# Patient Record
Sex: MALE | Race: WHITE | NOT HISPANIC OR LATINO | Employment: FULL TIME | RURAL
[De-identification: names, ages, dates, MRNs, and addresses within clinical notes are randomized per-mention and may not be internally consistent; named-entity substitution may affect disease eponyms.]

---

## 2022-04-16 ENCOUNTER — HOSPITAL ENCOUNTER (EMERGENCY)
Facility: HOSPITAL | Age: 52
Discharge: HOME | End: 2022-04-16
Payer: COMMERCIAL

## 2022-04-16 VITALS
BODY MASS INDEX: 30.52 KG/M2 | TEMPERATURE: 98 F | HEIGHT: 73 IN | RESPIRATION RATE: 18 BRPM | DIASTOLIC BLOOD PRESSURE: 83 MMHG | WEIGHT: 230.25 LBS | HEART RATE: 102 BPM | OXYGEN SATURATION: 96 % | SYSTOLIC BLOOD PRESSURE: 126 MMHG

## 2022-04-16 DIAGNOSIS — K76.0 HEPATIC STEATOSIS: ICD-10-CM

## 2022-04-16 DIAGNOSIS — K40.90 INGUINAL HERNIA WITHOUT OBSTRUCTION OR GANGRENE, RECURRENCE NOT SPECIFIED, UNSPECIFIED LATERALITY: ICD-10-CM

## 2022-04-16 DIAGNOSIS — K57.30 DIVERTICULOSIS OF COLON: Primary | ICD-10-CM

## 2022-04-16 DIAGNOSIS — R10.30 LOWER ABDOMINAL PAIN: ICD-10-CM

## 2022-04-16 LAB
BILIRUB UR QL STRIP: NEGATIVE
CLARITY UR: CLEAR
COLOR UR: YELLOW
GLUCOSE UR STRIP-MCNC: NEGATIVE MG/DL
KETONES UR STRIP-SCNC: NEGATIVE MG/DL
LEUKOCYTE ESTERASE UR QL STRIP: NEGATIVE
NITRITE UR QL STRIP: NEGATIVE
PH UR STRIP: 8.5 PH UNITS
PROT UR QL STRIP: NEGATIVE
RBC # UR STRIP: NEGATIVE /UL
SP GR UR STRIP: 1.02
UROBILINOGEN UR STRIP-ACNC: 0.2 MG/DL

## 2022-04-16 PROCEDURE — 99284 EMERGENCY DEPT VISIT MOD MDM: CPT | Mod: ,,, | Performed by: NURSE PRACTITIONER

## 2022-04-16 PROCEDURE — 99284 PR EMERGENCY DEPT VISIT,LEVEL IV: ICD-10-PCS | Mod: ,,, | Performed by: NURSE PRACTITIONER

## 2022-04-16 PROCEDURE — 81003 URINALYSIS AUTO W/O SCOPE: CPT | Performed by: NURSE PRACTITIONER

## 2022-04-16 PROCEDURE — 96374 THER/PROPH/DIAG INJ IV PUSH: CPT

## 2022-04-16 PROCEDURE — 96376 TX/PRO/DX INJ SAME DRUG ADON: CPT

## 2022-04-16 PROCEDURE — 63600175 PHARM REV CODE 636 W HCPCS: Performed by: NURSE PRACTITIONER

## 2022-04-16 PROCEDURE — 99284 EMERGENCY DEPT VISIT MOD MDM: CPT | Mod: 25

## 2022-04-16 PROCEDURE — 96375 TX/PRO/DX INJ NEW DRUG ADDON: CPT

## 2022-04-16 RX ORDER — HYDROCODONE BITARTRATE AND ACETAMINOPHEN 5; 325 MG/1; MG/1
1 TABLET ORAL EVERY 8 HOURS PRN
Qty: 10 TABLET | Refills: 0 | Status: SHIPPED | OUTPATIENT
Start: 2022-04-16

## 2022-04-16 RX ORDER — HYDROMORPHONE HYDROCHLORIDE 2 MG/ML
1 INJECTION, SOLUTION INTRAMUSCULAR; INTRAVENOUS; SUBCUTANEOUS
Status: COMPLETED | OUTPATIENT
Start: 2022-04-16 | End: 2022-04-16

## 2022-04-16 RX ORDER — MELOXICAM 15 MG/1
1 TABLET ORAL DAILY
COMMUNITY

## 2022-04-16 RX ORDER — METRONIDAZOLE 500 MG/1
500 TABLET ORAL EVERY 8 HOURS
Qty: 21 TABLET | Refills: 0 | Status: SHIPPED | OUTPATIENT
Start: 2022-04-16 | End: 2022-04-23

## 2022-04-16 RX ORDER — CIPROFLOXACIN 500 MG/1
500 TABLET ORAL 2 TIMES DAILY
Qty: 20 TABLET | Refills: 0 | Status: SHIPPED | OUTPATIENT
Start: 2022-04-16 | End: 2022-04-26

## 2022-04-16 RX ORDER — ONDANSETRON 2 MG/ML
4 INJECTION INTRAMUSCULAR; INTRAVENOUS
Status: COMPLETED | OUTPATIENT
Start: 2022-04-16 | End: 2022-04-16

## 2022-04-16 RX ORDER — HYDROMORPHONE HYDROCHLORIDE 2 MG/ML
2 INJECTION, SOLUTION INTRAMUSCULAR; INTRAVENOUS; SUBCUTANEOUS
Status: COMPLETED | OUTPATIENT
Start: 2022-04-16 | End: 2022-04-16

## 2022-04-16 RX ADMIN — HYDROMORPHONE HYDROCHLORIDE 1 MG: 2 INJECTION, SOLUTION INTRAMUSCULAR; INTRAVENOUS; SUBCUTANEOUS at 08:04

## 2022-04-16 RX ADMIN — HYDROMORPHONE HYDROCHLORIDE 2 MG: 2 INJECTION, SOLUTION INTRAMUSCULAR; INTRAVENOUS; SUBCUTANEOUS at 06:04

## 2022-04-16 RX ADMIN — ONDANSETRON 4 MG: 2 INJECTION INTRAMUSCULAR; INTRAVENOUS at 06:04

## 2022-04-16 NOTE — ED PROVIDER NOTES
Encounter Date: 4/16/2022       History     Chief Complaint   Patient presents with    Abdominal Pain    Groin Pain    Back Pain     ARRIVED AT ED C/O OF ABDOMINAL PAIN, BACK PAIN, GROIN PAIN X 1 DAY. HX OF KIDNEY CANCER, HAD 25 % OF RIGHT KIDNEY REMOVED IN 2019.     52 yo WM presents with 10/10 pain to lower abdomen. Constant since early AM, radiates into lower back and has a feeling of needing to have BM. No evidence of hernia on inspection. No history of kidney stones. Pain is located just above penis.         Review of patient's allergies indicates:  No Known Allergies  No past medical history on file.  No past surgical history on file.  No family history on file.     Review of Systems   Constitutional: Negative for chills and fever.   Respiratory: Negative for cough, shortness of breath and wheezing.    Cardiovascular: Negative for chest pain, palpitations and leg swelling.   Gastrointestinal: Positive for abdominal pain and nausea. Negative for constipation, diarrhea and vomiting.   Genitourinary: Negative for difficulty urinating, dysuria, genital sores, penile discharge, penile pain, penile swelling, scrotal swelling and testicular pain.   Psychiatric/Behavioral: The patient is nervous/anxious.    All other systems reviewed and are negative.      Physical Exam     Initial Vitals [04/16/22 1808]   BP Pulse Resp Temp SpO2   (!) 148/87 (!) 118 (!) 22 98.1 °F (36.7 °C) 99 %      MAP       --         Physical Exam    Nursing note and vitals reviewed.  Constitutional: He appears well-developed and well-nourished.   HENT:   Head: Normocephalic and atraumatic.   Nose: Nose normal.   Mouth/Throat: Oropharynx is clear and moist.   Eyes: Conjunctivae and EOM are normal. Pupils are equal, round, and reactive to light.   Neck: Neck supple.   Normal range of motion.  Cardiovascular: Regular rhythm. Tachycardia present.    Pulmonary/Chest: Breath sounds normal.   Abdominal: Abdomen is soft. There is abdominal  tenderness. There is guarding.   Genitourinary:    Penis normal.     Musculoskeletal:         General: Normal range of motion.      Cervical back: Normal range of motion and neck supple.     Neurological: He is alert and oriented to person, place, and time. He has normal strength. GCS score is 15. GCS eye subscore is 4. GCS verbal subscore is 5. GCS motor subscore is 6.   Psychiatric: His mood appears anxious.         Medical Screening Exam   See Full Note    ED Course   Procedures  Labs Reviewed   URINALYSIS, REFLEX TO URINE CULTURE - Abnormal; Notable for the following components:       Result Value    pH, UA 8.5 (*)     All other components within normal limits          Imaging Results          CT Abdomen Pelvis  Without Contrast (Final result)  Result time 04/16/22 18:50:19    Final result by Brigido Sung MD (04/16/22 18:50:19)                 Impression:      There is evidence of acute diverticulitis at the sigmoid colon level.  There is no abscess.  There is no evidence of pneumoperitoneum.    Cholelithiasis without obvious acute cholecystitis    Benign-appearing postsurgical changes of the right kidney    No hydronephrosis.  No radiopaque renal or ureteral stone      Electronically signed by: Brigido Sung  Date:    04/16/2022  Time:    18:50             Narrative:    EXAMINATION:  CT ABDOMEN AND PELVIS without contrast    CLINICAL HISTORY:  Lower abdominal pain.  Left lower quadrant pain.  History of renal cell carcinoma status post partial right nephrectomy    TECHNIQUE:  Axial CT images were obtained through the abdomen and pelvis without IV contrast oral contrast was not given.  Coronal and sagittal reconstructions submitted and interpreted.  Automated exposure control utilized.    COMPARISON:  No previous similar CT available    FINDINGS:  CT abdomen:    The partially visualized lung bases are clear.  There is no gross pleural or pericardial effusion.    There is no evidence of  pneumoperitoneum.    There is cholelithiasis.  There is no gallbladder wall thickening or abnormal pericholecystic edema.    There is moderate diffuse hepatic steatosis.  There is no focal hepatic mass.  There is no abnormal biliary dilatation.  Pancreas, spleen, bile ducts, adrenal glands, and left kidney appear normal.  There is a 36 mm fat density opacity projecting laterally from the mid right kidney which presumably represents an area of fat necrosis related to the previous partial nephrectomy.  There is no hydronephrosis on the right.    There is no abdominal aortic aneurysm.    There is diverticulosis of the colon.  There is strandy and hazy inflammatory change in the pericolonic fat at the sigmoid level compatible with acute diverticulitis.  There is no markie bowel obstruction.  Appendix is identified and appears normal.    CT pelvis:    Urinary bladder is mildly distended.  There is no pelvic soft tissue mass.  There is a fat containing left inguinal hernia.    No acute osseous findings.  There is mild lumbar spondylosis                                 Medications   HYDROmorphone (PF) injection 1 mg (has no administration in time range)   HYDROmorphone (PF) injection 2 mg (2 mg Intravenous Given 4/16/22 1828)   ondansetron injection 4 mg (4 mg Intravenous Given 4/16/22 1821)     Medical Decision Making:   Initial Assessment:   Suprapubic pain  ED Management:  - 52 yo WM with severe suprapubic abdominal pain. Beginning to radiate into lower back. Has feeling of needing to defecate.   - CT abdomen/pelvis ordered. Only able to do noncontrasted d/t problem with CT machine overheating  - Pain improved with medication  - CT with diverticulosis of sigmoid colon, inguinal hernia, cholelithiasis and hepatic steatosis  - Discussed CT findings with patient, offered telemed consult with [facility] and he states he will just come back if needed.   - Will treat with abx d/t level of pain, follow up with PCP. Short term of  Norco given after  checked.   - Return precautions discussed      Additional MDM:   Differential Diagnosis:   Symptom: Abdominal pain. <> The follow diagnoses were considered and will be evaluated: Cystitis, Epididymitis, Inguinal Hernia, Renal Stone and Testicular Torsion.                     Clinical Impression:   Final diagnoses:  [R10.30] Lower abdominal pain  [K57.30] Diverticulosis of colon (Primary)  [K76.0] Hepatic steatosis  [K40.90] Inguinal hernia without obstruction or gangrene, recurrence not specified, unspecified laterality          ED Disposition Condition    Discharge Stable        ED Prescriptions     Medication Sig Dispense Start Date End Date Auth. Provider    ciprofloxacin HCl (CIPRO) 500 MG tablet Take 1 tablet (500 mg total) by mouth 2 (two) times daily. for 10 days 20 tablet 4/16/2022 4/26/2022 CASEY Craig    metroNIDAZOLE (FLAGYL) 500 MG tablet Take 1 tablet (500 mg total) by mouth every 8 (eight) hours. for 7 days 21 tablet 4/16/2022 4/23/2022 CASEY Craig    HYDROcodone-acetaminophen (NORCO) 5-325 mg per tablet Take 1 tablet by mouth every 8 (eight) hours as needed for Pain. 10 tablet 4/16/2022  CASEY Craig        Follow-up Information    None                 CASEY Craig  04/16/22 2015

## 2022-04-17 NOTE — DISCHARGE INSTRUCTIONS
Follow up with your primary care provider as needed. Return to the ER for worsening symptoms. Thank you for choosing [facility] for your healthcare needs.

## 2022-04-17 NOTE — ED NOTES
Patient and wife given written and verbal instructions on s/s to observe for, both verbalized understanding.

## 2024-07-16 DIAGNOSIS — S46.011A STRAIN OF MUSCLE(S) AND TENDON(S) OF THE ROTATOR CUFF OF RIGHT SHOULDER, INITIAL ENCOUNTER: Primary | ICD-10-CM

## 2024-07-16 DIAGNOSIS — S46.111A STRAIN OF MUSCLE, FASCIA AND TENDON OF LONG HEAD OF BICEPS, RIGHT ARM, INITIAL ENCOUNTER: ICD-10-CM

## 2024-07-17 ENCOUNTER — CLINICAL SUPPORT (OUTPATIENT)
Dept: REHABILITATION | Facility: HOSPITAL | Age: 54
End: 2024-07-17
Payer: COMMERCIAL

## 2024-07-17 DIAGNOSIS — S46.011A STRAIN OF MUSCLE(S) AND TENDON(S) OF THE ROTATOR CUFF OF RIGHT SHOULDER, INITIAL ENCOUNTER: ICD-10-CM

## 2024-07-17 DIAGNOSIS — M25.511 ACUTE POSTOPERATIVE PAIN OF RIGHT SHOULDER: Primary | ICD-10-CM

## 2024-07-17 DIAGNOSIS — G89.18 ACUTE POSTOPERATIVE PAIN OF RIGHT SHOULDER: Primary | ICD-10-CM

## 2024-07-17 DIAGNOSIS — S46.111A STRAIN OF MUSCLE, FASCIA AND TENDON OF LONG HEAD OF BICEPS, RIGHT ARM, INITIAL ENCOUNTER: ICD-10-CM

## 2024-07-17 PROCEDURE — 97165 OT EVAL LOW COMPLEX 30 MIN: CPT

## 2024-07-17 PROCEDURE — 97530 THERAPEUTIC ACTIVITIES: CPT

## 2024-07-17 PROCEDURE — 97010 HOT OR COLD PACKS THERAPY: CPT

## 2024-07-17 PROCEDURE — 97110 THERAPEUTIC EXERCISES: CPT

## 2024-07-17 NOTE — PLAN OF CARE
Patient: Tyrel Marion [facility] #: [id number]  Date of evaluation: 2024     Referring Physician: Dinora Benson FN*  Diagnosis:   Encounter Diagnoses   Name Primary?    Strain of muscle(s) and tendon(s) of the rotator cuff of right shoulder, initial encounter     Strain of muscle, fascia and tendon of long head of biceps, right arm, initial encounter     Acute postoperative pain of right shoulder Yes     Referral Orders: Eval and Treat  Age: 53 y.o.  Sex: male          Hand dominance: Right  Physician Orders: Eval and Treat  Medical Diagnosis: biceps tenodesis and RC repair   Surgical Procedure and Date: RC repair and biceps tenodesis, 2024  Evaluation Date: 2024  Insurance Authorization Period Expiration: 2024 through 2024  Plan of Care Certification Period: 2024 through 2024  Date of Return to MD: 4 weeks from therapy eval  Visit # / Visits authorized:   FOTO: 1/ 3, INTAKE FOTO SCORE=44%, UPCOMING VISIT #5    Precautions:  none indicated    Time In: 0815  Time Out: 0910    Occupation: Maintenance at [employer]  Working presently: Yes  Last time worked: nights, presently  Workmen's Compensation: No    Date of onset: years of chronic shoulder pain  Involved area: right  Mechanism of Injury: normal wear and tear with heavy lifting and heavy work    No past medical history on file.  Precautions:   Current Outpatient Medications   Medication Sig    HYDROcodone-acetaminophen (NORCO) 5-325 mg per tablet Take 1 tablet by mouth every 8 (eight) hours as needed for Pain.    meloxicam (MOBIC) 15 MG tablet 1 tablet once daily at 6am.     No current facility-administered medications for this visit.     Review of patient's allergies indicates:  No Known Allergies  X-Rays/Tests: on file      Subjective:    Pain:  During no work: 1/10  While workin/10  Sleepin/10  Location of pain:    Tyrel's goals for therapy are: being able to sleep in the bed again without hurtin  "and going back to normal work       Objective:  Sensation Test: Patient denies any numbness/tingling    Observation/Inspection   Left Right   Anatomic Symmetry normal normal   Bony normal normal   Suparspinatus normal normal   Infraspinatus normal normal   Rhomboid normal normal     Observation/Inspection:  neither rounded shoulders nor forward head nor normal muscle tone for age      Range of Motion:   Right: limited as follows: (see measurements table below)  Left: WNL     (L) UE (R) UE     AROM PROM Norm   Shoulder Flexion   0-180   Shoulder Flexion wnl 95 180   Shoulder Abduction wnl 90 0-180   Shoulder Extension wnl 45 0-50   Shoulder Internal Rotation wnl 30 0-90   Shoulder External Rotation wnl 65 0-90   Horz Shoulder Adduction wnl 20 0-40     ROM Comments:   neither Soft end feel nor Pain at end range nor Pain at mid range     Painful Arc:   Patient demonstrates painful arc in shoulder flexion or abduction.  Through Flexion: Elevation 60 to 90 Degrees      Strength  Shoulder Flexion RUE: 2+/5   Shoulder Extension RUE: 3-/5   Shoulder Abduction RUE:  2+/5   Shoulder Adduction RUE:  2+/5   Internal Rotation RUE: 2+/5   External Rotation RUE: 2+/5   Horizontal Adduction RUE: 2+/5   Shoulder Flexion LUE: 5/5   Shoulder Extension LUE: 5/5   Shoulder Abduction LUE: 5/5   Shoulder Abbduction LUE: 5/5   Internal Rotation LUE:  5/5   External Rotation LUE:  5/5   Horizontal Adduction LUE:  5/5         Palpation: (for pain)     Positive: AC joint        Limitations of Functional Status:   Self Care: requires increase time to don clothes and reaching overhead  Work: has to have assist with any lifting  Leisure: "I haven't done anything since I had my surgery. I've been scared to."    Treatment included: OT evaluation, the following exercises (HEP) were instructed and Tyrel was able to demonstrate them prior to the end of the session. HEP are as follows:   Pendulum exercises  Reciprocal pulleys (pt has set of pulleys " the doctor office provided after surgery)        Assessment  This 53 y.o. male referred to Outpatient Occupational Therapy with diagnosis of   Encounter Diagnoses   Name Primary?    Strain of muscle(s) and tendon(s) of the rotator cuff of right shoulder, initial encounter     Strain of muscle, fascia and tendon of long head of biceps, right arm, initial encounter     Acute postoperative pain of right shoulder Yes    presents with limitations as described in problem list. Patient can benefit from Occupational Therapy services for PROM, AAROM, AROM, Theraputic exercises, joint mobs, home exercise program provied with written instructions, ice, strengthening, Theraband Ex, UBE, and pulley ex in order to maximize painfree functional use of  right UE. . The following goals were discussed with the patient and he is in agreement with them as to be addressed in the treatment plan.     Problem List:   Decreased function of Right UE, Decreased ROM, Increased pain, Decreased strength, Inability to perform work/tasks, Difficulty sleeping, Inability to perform leisure activiites, and Inability to perform self care tasks    Goals to be met in 4 weeks:  1) Pt will be independent and report performing HEP to maximize (R) shoulder functional capacity.  2) Pt will increase shoulder PROM in all measured planes of motion by 10 degrees to A with daily task.  3) Pt will report use of home modalities for pain management.  4) Pt will report one degree less of pain with nonuse and with use.  5) Pt will report interrupted sleep no more than twice a night.      CHARGES today:  Low complexity evaluation, 1 therex, 1 theract (pt HOME EXERCISE PROGRAM education and teaching)  Distributed handout for pulley exercises as well as pendulum exercises for pt to complete at home.    Discussed pt using biofreeze as well as ice application for home pain mgmt.  Pt relates good understanding.    Plan  Tyrel to be treated by Occupational Therapy 3 times  per week for 4 weeks to achieve the established goals.   Treatment to include AAROM Mobilization of GH joint, Ice, Strengthening Theraband Ex, UBE , and E- stim as well as any other treatments deemed necessary based on the patient's needs or progress.         I certify the need for these services furnished under this plan of treatment and while under my care    ____________________________________  Physician/Referring Practitioner    _______________  Date of Signature

## 2024-07-22 ENCOUNTER — CLINICAL SUPPORT (OUTPATIENT)
Dept: REHABILITATION | Facility: HOSPITAL | Age: 54
End: 2024-07-22
Payer: COMMERCIAL

## 2024-07-22 DIAGNOSIS — M25.511 ACUTE POSTOPERATIVE PAIN OF RIGHT SHOULDER: Primary | ICD-10-CM

## 2024-07-22 DIAGNOSIS — G89.18 ACUTE POSTOPERATIVE PAIN OF RIGHT SHOULDER: Primary | ICD-10-CM

## 2024-07-22 PROCEDURE — 97110 THERAPEUTIC EXERCISES: CPT

## 2024-07-22 PROCEDURE — 97010 HOT OR COLD PACKS THERAPY: CPT

## 2024-07-22 NOTE — PROGRESS NOTES
"[facility]  Occupational Therapy Treatment Note     Date: 7/22/2024  Name: Tyrel Marion [facility] Number: [id number]    Therapy Diagnosis: No diagnosis found.  Physician: Dinora Benson FN*    Referral Orders: Eval and Treat  Age: 53 y.o.  Sex: male          Hand dominance: Right  Physician Orders: Eval and Treat  Medical Diagnosis: biceps tenodesis and RC repair   Surgical Procedure and Date: RC repair and biceps tenodesis, 6/7/2024  Evaluation Date: 7/17/2024  Insurance Authorization Period Expiration: 7/17/2024 through 9/17/2024  Plan of Care Certification Period: 7/17/2024 through 9/17/2024  Date of Return to MD: 4 weeks from therapy eval  Visit # / Visits authorized: 2 / 12  FOTO: 1/ 3, INTAKE FOTO SCORE=44%, UPCOMING VISIT #5     Precautions:  none indicated     Time In: 0758  Time Out: 0853  TOTAL MINUTES: 55 minutes      Subjective     Patient reports: it's been aching this weekend.  I've been doing my exercises, the behind my back and the pulleys"  He was compliant with home exercise program given last session.   Response to previous treatment:progressing  Functional change: progressing    Pain: 3/10  Location: right shoulder      Objective     Objective Measures updated at progress report unless specified.    Treatment       Doni received the treatments listed below:      cold pack for 15 minutes to right shoulder.    therapeutic exercises to develop strength, endurance, ROM, and flexibility for 38 minutes including:  3 way pulleys 2 min each direction  UBE 5 min F/R motion low level resist  Shoulder Shrugs x 30 reps  Scap retracts x 30 reps  ER stretch x 10 x 10 s holds  IR stretch x 10 x 10 s holds  SUPINE ABC x 8 sets  Supine SA punches nonweighted  x 10 reps  GREEN putty squeeze, pull, twist x 5 min  ADD BEAR hug and BEAR hug rotation      Patient Education and Home Exercises     Education provided:   - new HOME EXERCISE PROGRAM exercises  - Progress and " prognosis towards goals     Written Home Exercises Provided: Patient instructed to cont prior HEP.  Exercises were reviewed and Doni was able to demonstrate them prior to the end of the session.  Doni demonstrated good  understanding of the home exercise program provided. See electronic medical record under Patient Instructions for exercises provided during therapy sessions.       Assessment     Pt progressing well, as expected.     Doni is progressing well towards his goals and there are no updates to goals at this time. Pt prognosis is Excellent.     Patient will continue to benefit from skilled outpatient occupational therapy to address the deficits listed in the problem list on initial evaluation provide patient/family education and to maximize patient's level of independence in the home and community environment.     Patient's spiritual, cultural and educational needs considered and patient agreeable to plan of care and goals.    Anticipated barriers to occupational therapy: none at this time    Goals:  Goals to be met in 4 weeks:  1) Pt will be independent and report performing HEP to maximize (R) shoulder functional capacity.  2) Pt will increase shoulder PROM in all measured planes of motion by 10 degrees to A with daily task.  3) Pt will report use of home modalities for pain management.  4) Pt will report one degree less of pain with nonuse and with use.  5) Pt will report interrupted sleep no more than twice a night.    Plan     Updates/Grading for next session: cont adding exercises per protocol for biceps tenodesis.    Jade Funez OT   7/22/2024

## 2024-07-24 ENCOUNTER — CLINICAL SUPPORT (OUTPATIENT)
Dept: REHABILITATION | Facility: HOSPITAL | Age: 54
End: 2024-07-24
Payer: COMMERCIAL

## 2024-07-24 DIAGNOSIS — G89.18 ACUTE POSTOPERATIVE PAIN OF RIGHT SHOULDER: Primary | ICD-10-CM

## 2024-07-24 DIAGNOSIS — M25.511 ACUTE POSTOPERATIVE PAIN OF RIGHT SHOULDER: Primary | ICD-10-CM

## 2024-07-24 PROCEDURE — 97110 THERAPEUTIC EXERCISES: CPT

## 2024-07-24 PROCEDURE — 97010 HOT OR COLD PACKS THERAPY: CPT

## 2024-07-24 NOTE — PROGRESS NOTES
[facility]  Occupational Therapy Treatment Note     Date: 7/24/2024  Name: Tyrel [facility] Number: [id number]    Therapy Diagnosis: No diagnosis found.  Physician: Dinora Benson FN*    Referral Orders: Eval and Treat  Age: 53 y.o.  Sex: male          Hand dominance: Right  Physician Orders: Eval and Treat  Medical Diagnosis: biceps tenodesis and RC repair   Surgical Procedure and Date: RC repair and biceps tenodesis, 6/7/2024  Evaluation Date: 7/17/2024  Insurance Authorization Period Expiration: 7/17/2024 through 9/17/2024  Plan of Care Certification Period: 7/17/2024 through 9/17/2024  Date of Return to MD: 4 weeks from therapy eval  Visit # / Visits authorized: 3 / 12  FOTO: 1/ 3, INTAKE FOTO SCORE=44%, UPCOMING VISIT #5     Precautions:  none indicated     Time In: 0800  Time Out: 0845  TOTAL MINUTES: 45 minutes      Subjective     Patient reports: it just steve hurts, been real achy  He was compliant with home exercise program given last session.   Response to previous treatment:progressing  Functional change: progressing    Pain: 2/10 during rest but during exercise pain number is 4/10  Location: right shoulder      Objective     Objective Measures updated at progress report unless specified.    Treatment       Doni received the treatments listed below:      therapeutic exercises to develop strength, endurance, ROM, and flexibility for 45 minutes including:  3 way pulleys 2 min each direction  UBE 5 min F/R motion low level resist  Shoulder Shrugs x 30 reps  Scap retracts x 30 reps  ER stretch x 10 x 10 s holds  IR stretch x 10 x 10 s holds  SUPINE ABC x  sets  Supine SA punches nonweighted  x 10 reps  GREEN putty squeeze, pull, twist x 10 min  BEAR hug and BEAR hug rotation x 5    Ice application x 15 minutes at end of session while completing green putty      Patient Education and Home Exercises     Education provided:   - new HOME EXERCISE PROGRAM  exercises  - Progress and prognosis towards goals     Written Home Exercises Provided: Patient instructed to cont prior HEP.  Exercises were reviewed and Doni was able to demonstrate them prior to the end of the session.  Doni demonstrated good  understanding of the home exercise program provided. See electronic medical record under Patient Instructions for exercises provided during therapy sessions.       Assessment     Pt progressing well, as expected.     Doni is progressing well towards his goals and there are no updates to goals at this time. Pt prognosis is Excellent.     Patient will continue to benefit from skilled outpatient occupational therapy to address the deficits listed in the problem list on initial evaluation provide patient/family education and to maximize patient's level of independence in the home and community environment.     Patient's spiritual, cultural and educational needs considered and patient agreeable to plan of care and goals.    Anticipated barriers to occupational therapy: none at this time    Goals:  Goals to be met in 4 weeks:  1) Pt will be independent and report performing HEP to maximize (R) shoulder functional capacity.  2) Pt will increase shoulder PROM in all measured planes of motion by 10 degrees to A with daily task.  3) Pt will report use of home modalities for pain management.  4) Pt will report one degree less of pain with nonuse and with use.  5) Pt will report interrupted sleep no more than twice a night.    Plan     Updates/Grading for next session: cont adding exercises per protocol for biceps tenodesis.    Jade Funez OT   7/24/2024

## 2024-07-26 ENCOUNTER — CLINICAL SUPPORT (OUTPATIENT)
Dept: REHABILITATION | Facility: HOSPITAL | Age: 54
End: 2024-07-26
Payer: COMMERCIAL

## 2024-07-26 DIAGNOSIS — M25.511 ACUTE POSTOPERATIVE PAIN OF RIGHT SHOULDER: Primary | ICD-10-CM

## 2024-07-26 DIAGNOSIS — G89.18 ACUTE POSTOPERATIVE PAIN OF RIGHT SHOULDER: Primary | ICD-10-CM

## 2024-07-26 PROCEDURE — 97010 HOT OR COLD PACKS THERAPY: CPT

## 2024-07-26 PROCEDURE — 97110 THERAPEUTIC EXERCISES: CPT

## 2024-07-26 NOTE — PROGRESS NOTES
[facility]  Occupational Therapy Treatment Note     Date: 7/26/2024  Name: Tyrel Marion [facility] Number: [id number]    Therapy Diagnosis:   Encounter Diagnosis   Name Primary?    Acute postoperative pain of right shoulder Yes     Physician: Dinora Benson FN*    Referral Orders: Eval and Treat  Age: 53 y.o.  Sex: male          Hand dominance: Right  Physician Orders: Eval and Treat  Medical Diagnosis: biceps tenodesis and RC repair   Surgical Procedure and Date: RC repair and biceps tenodesis, 6/7/2024  Evaluation Date: 7/17/2024  Insurance Authorization Period Expiration: 7/17/2024 through 9/17/2024  Plan of Care Certification Period: 7/17/2024 through 9/17/2024  Date of Return to MD: 4 weeks from therapy eval  Visit # / Visits authorized: 4/ 12  FOTO: 1/ 3, INTAKE FOTO SCORE=44%, UPCOMING VISIT #5     Precautions:  none indicated     Time In: 0805  Time Out: 0850  TOTAL MINUTES: 45 minutes      Subjective     Patient reports: it's a lot better this week, didn't ache near as bad  He was compliant with home exercise program given last session.   Response to previous treatment:progressing  Functional change: progressing    Pain: 2/10 during rest but during exercise pain number is 3/10  Location: right shoulder      Objective     Objective Measures updated at progress report unless specified.    Treatment       Doni received the treatments listed below:      therapeutic exercises to develop strength, endurance, ROM, and flexibility for 35 minutes including:  3 way pulleys 2 min each direction  UBE 5 min F/R motion low level resist  Shoulder Shrugs x 30 reps  Scap retracts x 30 reps  ER stretch x 10 x 10 s holds  IR stretch x 10 x 10 s holds  SUPINE ABC x  sets  Supine SA punches nonweighted  x 10 reps  BEAR hug and BEAR hug rotation x 5 NOT DONE TODAY  Scap 6 way 5 to 10 reps each direction, on mat prone                     Theract  ADD Wall slides next visit  VICENTE serrano  squeeze, pull, twist x 10 min (NOT done today)    Ice application x 10 minutes at end of session      Patient Education and Home Exercises     Education provided:   - new HOME EXERCISE PROGRAM exercises  - Progress and prognosis towards goals     Written Home Exercises Provided: Patient instructed to cont prior HEP.  Exercises were reviewed and Doni was able to demonstrate them prior to the end of the session.  Doni demonstrated good  understanding of the home exercise program provided. See electronic medical record under Patient Instructions for exercises provided during therapy sessions.       Assessment     Pt progressing well, as expected.     Doni is progressing well towards his goals and there are no updates to goals at this time. Pt prognosis is Excellent.     Patient will continue to benefit from skilled outpatient occupational therapy to address the deficits listed in the problem list on initial evaluation provide patient/family education and to maximize patient's level of independence in the home and community environment.     Patient's spiritual, cultural and educational needs considered and patient agreeable to plan of care and goals.    Anticipated barriers to occupational therapy: none at this time    Goals:  Goals to be met in 4 weeks:  1) Pt will be independent and report performing HEP to maximize (R) shoulder functional capacity. ONGOING/PROGRESSING  2) Pt will increase shoulder PROM in all measured planes of motion by 10 degrees to A with daily task. ONGOING/PROGRESSING  3) Pt will report use of home modalities for pain management. MET  4) Pt will report one degree less of pain with nonuse and with use. MET  5) Pt will report interrupted sleep no more than twice a night. ONGOING/PROGRESSING    Plan     Updates/Grading for next session: cont adding exercises per protocol for biceps tenodesis.    Jade Funez OT   7/26/2024

## 2024-07-29 ENCOUNTER — CLINICAL SUPPORT (OUTPATIENT)
Dept: REHABILITATION | Facility: HOSPITAL | Age: 54
End: 2024-07-29
Payer: COMMERCIAL

## 2024-07-29 DIAGNOSIS — M25.511 ACUTE POSTOPERATIVE PAIN OF RIGHT SHOULDER: Primary | ICD-10-CM

## 2024-07-29 DIAGNOSIS — G89.18 ACUTE POSTOPERATIVE PAIN OF RIGHT SHOULDER: Primary | ICD-10-CM

## 2024-07-29 PROCEDURE — 97530 THERAPEUTIC ACTIVITIES: CPT

## 2024-07-29 PROCEDURE — 97010 HOT OR COLD PACKS THERAPY: CPT

## 2024-07-29 PROCEDURE — 97110 THERAPEUTIC EXERCISES: CPT

## 2024-07-29 NOTE — PROGRESS NOTES
[facility]ERAP[facility]  Occupational Therapy Treatment Note     Date: 7/29/2024  Name: Tyrel Marion [facility] Number: [id number]    Therapy Diagnosis:   No diagnosis found.    Physician: Dinora Benson FN*    Referral Orders: Eval and Treat  Age: 53 y.o.  Sex: male          Hand dominance: Right  Physician Orders: Eval and Treat  Medical Diagnosis: biceps tenodesis and RC repair   Surgical Procedure and Date: RC repair and biceps tenodesis, 6/7/2024  Evaluation Date: 7/17/2024  Insurance Authorization Period Expiration: 7/17/2024 through 9/17/2024  Plan of Care Certification Period: 7/17/2024 through 9/17/2024  Date of Return to MD: 4 weeks from therapy eval  Visit # / Visits authorized: 5/ 12  FOTO: 1/ 3, INTAKE FOTO SCORE=44%, 51 VISIT #5     Precautions:  none indicated     Time In: 0800  Time Out: 0852  TOTAL MINUTES: 52 minutes      Subjective     Patient reports: it's feeling better  He was compliant with home exercise program given last session.   Response to previous treatment:progressing  Functional change: progressing    Pain: 2/10 during rest but during exercise pain number is 3/10  Location: right shoulder      Objective     Objective Measures updated at progress report unless specified.    Treatment       Doni received the treatments listed below:      therapeutic exercises to develop strength, endurance, ROM, and flexibility for 30 minutes including:  3 way pulleys 2 min each direction  UBE 5 min F/R motion low level resist  ER stretch x 10 x 10 s holds  IR stretch x 10 x 10 s holds  SUPINE ABC x  15 sets with 1# dumbbell  Supine SA punches nonweighted  x 10 reps with 1# dumbbell  BEAR hug and BEAR hug rotation x 5 NOT DONE TODAY  UBE x 5 min level 5 resist F/R   SCAP 3 way NEXT VISIT  RC 4 way NEXT VISIT    Theract x 12 min  Wall slides nonweighted 15 reps flexion only  BLUE putty squeeze, pull, twist x 10 min (NOT done today)    Ice application x 12 minutes at end of  session      Patient Education and Home Exercises     Education provided:   - new HOME EXERCISE PROGRAM exercises  - Progress and prognosis towards goals     Written Home Exercises Provided: Patient instructed to cont prior HEP.  Exercises were reviewed and Doni was able to demonstrate them prior to the end of the session.  Doni demonstrated good  understanding of the home exercise program provided. See electronic medical record under Patient Instructions for exercises provided during therapy sessions.       Assessment     Pt progressing well, as expected.     Doni is progressing well towards his goals and there are no updates to goals at this time. Pt prognosis is Excellent.     Patient will continue to benefit from skilled outpatient occupational therapy to address the deficits listed in the problem list on initial evaluation provide patient/family education and to maximize patient's level of independence in the home and community environment.     Patient's spiritual, cultural and educational needs considered and patient agreeable to plan of care and goals.    Anticipated barriers to occupational therapy: none at this time    Goals:  Goals to be met in 4 weeks:  1) Pt will be independent and report performing HEP to maximize (R) shoulder functional capacity. ONGOING/PROGRESSING  2) Pt will increase shoulder PROM in all measured planes of motion by 10 degrees to A with daily task. ONGOING/PROGRESSING  3) Pt will report use of home modalities for pain management. MET  4) Pt will report one degree less of pain with nonuse and with use. MET  5) Pt will report interrupted sleep no more than twice a night. ONGOING/PROGRESSING    Plan     Updates/Grading for next session: cont adding exercises per protocol for biceps tenodesis.    Jade Funez, OT   7/29/2024

## 2024-07-31 ENCOUNTER — CLINICAL SUPPORT (OUTPATIENT)
Dept: REHABILITATION | Facility: HOSPITAL | Age: 54
End: 2024-07-31
Payer: COMMERCIAL

## 2024-07-31 DIAGNOSIS — M25.511 ACUTE POSTOPERATIVE PAIN OF RIGHT SHOULDER: Primary | ICD-10-CM

## 2024-07-31 DIAGNOSIS — G89.18 ACUTE POSTOPERATIVE PAIN OF RIGHT SHOULDER: Primary | ICD-10-CM

## 2024-07-31 PROCEDURE — 97530 THERAPEUTIC ACTIVITIES: CPT

## 2024-07-31 PROCEDURE — 97110 THERAPEUTIC EXERCISES: CPT

## 2024-07-31 PROCEDURE — 97010 HOT OR COLD PACKS THERAPY: CPT

## 2024-07-31 NOTE — PROGRESS NOTES
"[facility]  Occupational Therapy Treatment Note     Date: 7/31/2024  Name: Tyrel Marion [facility]  Clinic Number: [id number]    Therapy Diagnosis:   Encounter Diagnosis   Name Primary?    Acute postoperative pain of right shoulder Yes       Physician: Dinora Benson FN*    Referral Orders: Eval and Treat  Age: 53 y.o.  Sex: male          Hand dominance: Right  Physician Orders: Eval and Treat  Medical Diagnosis: biceps tenodesis and RC repair   Surgical Procedure and Date: RC repair and biceps tenodesis, 6/7/2024  Evaluation Date: 7/17/2024  Insurance Authorization Period Expiration: 7/17/2024 through 9/17/2024  Plan of Care Certification Period: 7/17/2024 through 9/17/2024  Date of Return to MD: 4 weeks from therapy eval  Visit # / Visits authorized: 6/ 12  FOTO: 1/ 3, INTAKE FOTO SCORE=44%, 51 VISIT #5     Precautions:  none indicated     Time In: 0805  Time Out: 0855  TOTAL MINUTES: 50 minutes      Subjective     Patient reports:no new complaints; pt states, "my arm has felt better.  I can sleep in the bed now!"  He was compliant with home exercise program given last session.   Response to previous treatment:progressing  Functional change: progressing    Pain: 1/10   Location: right shoulder      Objective     Objective Measures updated at progress report unless specified.    Treatment       Doni received the treatments listed below:      therapeutic exercises to develop strength, endurance, ROM, and flexibility for 30 minutes including:  3 way pulleys 2 min each direction  UBE 5 min F/R motion level 6 resist  ER stretch x 10 x 10 s holds  IR stretch x 10 x 10 s holds  SUPINE ABC x  15 sets with 1# dumbbell  Supine SA punches nonweighted  x 10 reps with 1# dumbbell  BEAR hug and BEAR hug rotation x 5   UBE x 5 min level 5 resist F/R   SCAP 3 way RED 25x  RC 4 way NEXT VISIT    Theract x 10 min  Wall slides nonweighted 15 reps flexion only  BLUE putty squeeze, pull, twist x 10 min "     Ice application x 10 minutes at end of session      Patient Education and Home Exercises     Education provided:   - new HOME EXERCISE PROGRAM exercises  - Progress and prognosis towards goals     Written Home Exercises Provided: Patient instructed to cont prior HEP.  Exercises were reviewed and Doni was able to demonstrate them prior to the end of the session.  Doni demonstrated good  understanding of the home exercise program provided. See electronic medical record under Patient Instructions for exercises provided during therapy sessions.       Assessment     Pt progressing well, as expected.     Doni is progressing well towards his goals and there are no updates to goals at this time. Pt prognosis is Excellent.     Patient will continue to benefit from skilled outpatient occupational therapy to address the deficits listed in the problem list on initial evaluation provide patient/family education and to maximize patient's level of independence in the home and community environment.     Patient's spiritual, cultural and educational needs considered and patient agreeable to plan of care and goals.    Anticipated barriers to occupational therapy: none at this time    Goals:  Goals to be met in 4 weeks:  1) Pt will be independent and report performing HEP to maximize (R) shoulder functional capacity. ONGOING/PROGRESSING  2) Pt will increase shoulder PROM in all measured planes of motion by 10 degrees to A with daily task. ONGOING/PROGRESSING  3) Pt will report use of home modalities for pain management. MET  4) Pt will report one degree less of pain with nonuse and with use. MET  5) Pt will report interrupted sleep no more than twice a night. ONGOING/PROGRESSING    Plan     Updates/Grading for next session: cont adding exercises per protocol for biceps tenodesis.    Jade Funez OT   7/31/2024

## 2024-08-02 ENCOUNTER — CLINICAL SUPPORT (OUTPATIENT)
Dept: REHABILITATION | Facility: HOSPITAL | Age: 54
End: 2024-08-02
Payer: COMMERCIAL

## 2024-08-02 DIAGNOSIS — G89.18 ACUTE POSTOPERATIVE PAIN OF RIGHT SHOULDER: Primary | ICD-10-CM

## 2024-08-02 DIAGNOSIS — M25.511 ACUTE POSTOPERATIVE PAIN OF RIGHT SHOULDER: Primary | ICD-10-CM

## 2024-08-02 PROCEDURE — 97010 HOT OR COLD PACKS THERAPY: CPT

## 2024-08-02 PROCEDURE — 97110 THERAPEUTIC EXERCISES: CPT

## 2024-08-02 PROCEDURE — 97530 THERAPEUTIC ACTIVITIES: CPT

## 2024-08-02 NOTE — PROGRESS NOTES
[facility]  Occupational Therapy Treatment Note     Date: 8/2/2024  Name: Tyrel Marion [facility] Number: [id number]    Therapy Diagnosis:   Encounter Diagnosis   Name Primary?    Acute postoperative pain of right shoulder Yes       Physician: Dinora Benson FN*    Referral Orders: Eval and Treat  Age: 53 y.o.  Sex: male          Hand dominance: Right  Physician Orders: Eval and Treat  Medical Diagnosis: biceps tenodesis and RC repair   Surgical Procedure and Date: RC repair and biceps tenodesis, 6/7/2024  Evaluation Date: 7/17/2024  Insurance Authorization Period Expiration: 7/17/2024 through 9/17/2024  Plan of Care Certification Period: 7/17/2024 through 9/17/2024  Date of Return to MD: 4 weeks from therapy eval  Visit # / Visits authorized: 7/ 12  FOTO: 1/ 3, INTAKE FOTO SCORE=44%, 51% VISIT #5     Precautions:  none indicated     Time In: 0800  Time Out: 0900  TOTAL MINUTES: 60 minutes      Subjective     Patient reports:I believe this arm is doing a lot better!  He was compliant with home exercise program given last session.   Response to previous treatment:progressing  Functional change: progressing    Pain: 1/10   Location: right shoulder      Objective     Objective Measures updated at progress report unless specified.    Treatment       Doni received the treatments listed below:      therapeutic exercises to develop strength, endurance, ROM, and flexibility for 45 minutes including:  3 way pulleys 2 min each direction  UBE 5 min F/R motion level 6 resist  ER stretch x 10 x 10 s holds  IR stretch x 10 x 10 s holds  SUPINE ABC x  15 sets with 2# dumbbell  Supine SA punches nonweighted  x 10 reps with 2# dumbbell  BEAR hug and BEAR hug rotation x 5   UBE x 5 min level 7 resist F/R   SCAP 3 way BLUE 25x  RC 4 way 10 reps each RED    Theract x 8 min  Wall slides 1# weight 15 reps circular right and left, 20x flexion, 3 way  BLUE putty squeeze, pull, twist x 10 min     Ice  application x 15 minutes at end of session with blue putty activity completed during ice application      Patient Education and Home Exercises     Education provided:   - new HOME EXERCISE PROGRAM exercises  - Progress and prognosis towards goals     Written Home Exercises Provided: Patient instructed to cont prior HEP.  Exercises were reviewed and Doni was able to demonstrate them prior to the end of the session.  Doni demonstrated good  understanding of the home exercise program provided. See electronic medical record under Patient Instructions for exercises provided during therapy sessions.       Assessment     Pt progressing well, as expected.     Doni is progressing well towards his goals and there are no updates to goals at this time. Pt prognosis is Excellent.     Patient will continue to benefit from skilled outpatient occupational therapy to address the deficits listed in the problem list on initial evaluation provide patient/family education and to maximize patient's level of independence in the home and community environment.     Patient's spiritual, cultural and educational needs considered and patient agreeable to plan of care and goals.    Anticipated barriers to occupational therapy: none at this time    Goals:  Goals to be met in 4 weeks:  1) Pt will be independent and report performing HEP to maximize (R) shoulder functional capacity. ONGOING/PROGRESSING  2) Pt will increase shoulder PROM in all measured planes of motion by 10 degrees to A with daily task. ONGOING/PROGRESSING  3) Pt will report use of home modalities for pain management. MET  4) Pt will report one degree less of pain with nonuse and with use. MET  5) Pt will report interrupted sleep no more than twice a night. ONGOING/PROGRESSING    Plan     Updates/Grading for next session: cont adding exercises per protocol for biceps tenodesis.    Jade Funez OT   8/2/2024

## 2024-08-05 ENCOUNTER — CLINICAL SUPPORT (OUTPATIENT)
Dept: REHABILITATION | Facility: HOSPITAL | Age: 54
End: 2024-08-05
Payer: COMMERCIAL

## 2024-08-05 DIAGNOSIS — M25.511 ACUTE POSTOPERATIVE PAIN OF RIGHT SHOULDER: Primary | ICD-10-CM

## 2024-08-05 DIAGNOSIS — G89.18 ACUTE POSTOPERATIVE PAIN OF RIGHT SHOULDER: Primary | ICD-10-CM

## 2024-08-05 PROCEDURE — 97014 ELECTRIC STIMULATION THERAPY: CPT

## 2024-08-05 PROCEDURE — 97010 HOT OR COLD PACKS THERAPY: CPT

## 2024-08-05 PROCEDURE — 97110 THERAPEUTIC EXERCISES: CPT

## 2024-08-05 PROCEDURE — 97140 MANUAL THERAPY 1/> REGIONS: CPT

## 2024-08-13 ENCOUNTER — DOCUMENTATION ONLY (OUTPATIENT)
Dept: REHABILITATION | Facility: HOSPITAL | Age: 54
End: 2024-08-13
Payer: COMMERCIAL

## 2024-08-13 NOTE — PROGRESS NOTES
"  Patient has not returned to therapy since 8/5/2024 and has not scheduled to return as of yet.  It is unclear if he is waiting to return to his physician for his followup visit prior to returning to therapy.  Have discussed this with our Patient Access Rep, and she is following up on this with the patient.  He reported on last visit "I don't know what's wrong. I didn't do anything to it. Unless I slept wrong, cause after I got up from sleeping yesterday, it felt stiff and then it just started swelling."  Will wait on d/c until hearing from patient for further clarification.    Jade Funez, OTR/RONNIE   8/13/2024  "

## 2024-09-03 ENCOUNTER — CLINICAL SUPPORT (OUTPATIENT)
Dept: REHABILITATION | Facility: HOSPITAL | Age: 54
End: 2024-09-03
Payer: COMMERCIAL

## 2024-09-03 DIAGNOSIS — G89.18 ACUTE POSTOPERATIVE PAIN OF RIGHT SHOULDER: Primary | ICD-10-CM

## 2024-09-03 DIAGNOSIS — M25.511 ACUTE POSTOPERATIVE PAIN OF RIGHT SHOULDER: Primary | ICD-10-CM

## 2024-09-03 PROCEDURE — 97035 APP MDLTY 1+ULTRASOUND EA 15: CPT

## 2024-09-03 PROCEDURE — 97110 THERAPEUTIC EXERCISES: CPT

## 2024-09-03 NOTE — PLAN OF CARE
Reasons for Recertification of Therapy: patient has missed 4 weeks of tx due to setback in his care; see note today for details.  Per pt reports, surgeon states that pt new imaging is negative for new problems in the right shoulder area, so POC will not change other than to add ultrasound modality to address inflammation and edema pocketing in posterior/mid shoulder distal to right shoulder joint.    Plan     Updated Certification Period: 9/3/2024 to 11/3/2024  Recommended Treatment Plan: 2 times per week for 6 weeks: Moist Heat/ Ice, Neuromuscular Re-ed, Patient Education, Therapeutic Activities, Therapeutic Exercise, and Ultrasound  Other Recommendations: modalities as needed to address pain and inflammation    Jade Funez OT  9/3/2024      I CERTIFY THE NEED FOR THESE SERVICES FURNISHED UNDER THIS PLAN OF TREATMENT AND WHILE UNDER MY CARE.    Physician's comments:      Physician's Signature: ___________________________________________________

## 2024-09-03 NOTE — PROGRESS NOTES
[facility]  Occupational Therapy Treatment Note     Date: 9/3/2024  Name: Tyrel Marion [facility]  Clinic Number: [id number]    Therapy Diagnosis:   Encounter Diagnosis   Name Primary?    Acute postoperative pain of right shoulder Yes       Physician: Dinora Benson FN*    Referral Orders: Eval and Treat  Age: 53 y.o.  Sex: male          Hand dominance: Right  Physician Orders: Eval and Treat  Medical Diagnosis: biceps tenodesis and RC repair   Surgical Procedure and Date: RC repair and biceps tenodesis, 6/7/2024  Evaluation Date: 7/17/2024  Insurance Authorization Period Expiration: 7/17/2024 through 9/17/2024  Plan of Care Certification Period: 7/17/2024 through 9/17/2024  Date of Return to MD: 4 weeks from therapy eval  Visit # / Visits authorized: 9/ 12  FOTO: 1/ 3, INTAKE FOTO SCORE=44%, 51% VISIT #5     Precautions:  none indicated     Time In: 0915  Time Out: 1000  TOTAL MINUTES: 45 minutes      Subjective     Patient reports The doctor said I didn't have anything wrong with my shoulder, but he thinks my pain is coming from my neck.  He was compliant with home exercise program given last session.   Response to previous treatment:progressing  Functional change: progressing        Pain: 7/10   Location: right shoulder      Objective     Objective Measures updated at progress report unless specified.    Treatment       Doni received the treatments listed below:        THEREX modified today due to acute pain and for approx 20 minutes:  UBE x 5 min in no resist form  IR stretch x 10  ER stretch in corner x 10  3 way pulleys 2 min each direction  SCAP 3 way RED 25x  RC 4 way 25 reps each MANUEL Sarah received ultrasound to manage pain and inflammation at 10 % duty cycle applied to the right shoulder below shoulder joint and in mid to posterior proximity at an intensity of  1.1mHz at 5 W/cm2  for a duration of 5 minutes to target built up edema visible in this area. Patient  "tolerated treatment well without adverse effects. Therapist was in attendance throughout intervention.    A Portion of pt tx session was spent in concurrent treatment with another patient (Medicare A/B), so charges adjusted as needed for this concurrent tx.      Patient Education and Home Exercises     Education provided:   - new HOME EXERCISE PROGRAM exercises  - Progress and prognosis towards goals     Written Home Exercises Provided: Patient instructed to cont prior HEP.  Exercises were reviewed and Doni was able to demonstrate them prior to the end of the session.  Doni demonstrated good  understanding of the home exercise program provided. See electronic medical record under Patient Instructions for exercises provided during therapy sessions.       Assessment     Pt has had set back in pain and mobility due to what he thinks is "sleeping wrong."  But as he went back to see his ortho physician, the surgeon told him it is felt the pain and problem are coming from patient neck area, not his shoulder.  Pt reports that his imaging was negative for new problems in his shoulder.  Pt also could not state if physician wanted him to address the cervical neck problem that was found per pt report.    Doni is progressing well towards his goals and there are no updates to goals at this time. Pt prognosis is Excellent overall.    Patient will continue to benefit from skilled outpatient occupational therapy to address the deficits listed in the problem list on initial evaluation provide patient/family education and to maximize patient's level of independence in the home and community environment.     Patient's spiritual, cultural and educational needs considered and patient agreeable to plan of care and goals.    Anticipated barriers to occupational therapy: none at this time    Goals:  Goals to be met in 4 weeks:  1) Pt will be independent and report performing HEP to maximize (R) shoulder functional capacity. " ONGOING/PROGRESSING  2) Pt will increase shoulder PROM in all measured planes of motion by 10 degrees to A with daily task. ONGOING/PROGRESSING  3) Pt will report use of home modalities for pain management. MET  4) Pt will report one degree less of pain with nonuse and with use. MET  5) Pt will report interrupted sleep no more than twice a night. ONGOING/PROGRESSING    Plan     Updates/Grading for next session: adding Ultrasound to reduce edema in right shoulder.  Cont with POC as pt can tolerate.  Pt has new script that we will add to his EMR; we will update the visit numbers authorized by physician as needed.    Jade Funez OT   9/3/2024

## 2024-09-05 ENCOUNTER — CLINICAL SUPPORT (OUTPATIENT)
Dept: REHABILITATION | Facility: HOSPITAL | Age: 54
End: 2024-09-05
Payer: COMMERCIAL

## 2024-09-05 DIAGNOSIS — M25.511 ACUTE POSTOPERATIVE PAIN OF RIGHT SHOULDER: Primary | ICD-10-CM

## 2024-09-05 DIAGNOSIS — G89.18 ACUTE POSTOPERATIVE PAIN OF RIGHT SHOULDER: Primary | ICD-10-CM

## 2024-09-05 PROCEDURE — 97110 THERAPEUTIC EXERCISES: CPT

## 2024-09-05 PROCEDURE — 97035 APP MDLTY 1+ULTRASOUND EA 15: CPT

## 2024-09-05 PROCEDURE — 97530 THERAPEUTIC ACTIVITIES: CPT

## 2024-09-05 NOTE — PROGRESS NOTES
[facility]  Occupational Therapy Treatment Note     Date: 9/5/2024  Name: Tyrel Marion [facility]  Clinic Number: [id number]    Therapy Diagnosis:   Encounter Diagnosis   Name Primary?    Acute postoperative pain of right shoulder Yes       Physician: Dinora Benson FN*    Referral Orders: Eval and Treat  Age: 53 y.o.  Sex: male          Hand dominance: Right  Physician Orders: Eval and Treat  Medical Diagnosis: biceps tenodesis and RC repair   Surgical Procedure and Date: RC repair and biceps tenodesis, 6/7/2024  Evaluation Date: 7/17/2024  Insurance Authorization Period Expiration: 7/17/2024 through 9/17/2024  Plan of Care Certification Period: 7/17/2024 through 9/17/2024  Date of Return to MD: 4 weeks from therapy eval  Visit # / Visits authorized: 10/ 12  FOTO: 1/ 3, INTAKE FOTO SCORE=44%, 51% VISIT #5, 57% VISIT 9, UPCOMING LAST VISIT     Precautions:  none indicated     Time In: 0850  Time Out: 0941  TOTAL MINUTES: 51 minutes      Subjective     Patient reports I see now that I am using my neck and upper shoulder muscles to move my arm, and that's what's causing my pain.  He was compliant with home exercise program given last session.   Response to previous treatment:progressing  Functional change: progressing        Pain: 5/10   Location: right shoulder      Objective     Objective Measures updated at progress report unless specified.    Treatment       Doni received the treatments listed below:        THEREX modified today due to acute pain and for approx 22 minutes:  UBE x 5 min in no resist form to avoid straining trapezius  IR stretch x 10  ER stretch in corner x 10  3 way pulleys 2 min each direction  SCAP 3 way RED 25x  RC 4 way 25 reps each RED    THERACT x 15 min:  Wall slides 1# wrist weight, 15 reps each direction    Pt needed to be educated on the following:  To avoid trapezius strain during shoulder therapy, you can try these tips:      Avoid overexertion:  Gradually increase the intensity of your exercise routine to prevent sudden muscle strain.      Stretch: Stretching can help prevent or relieve pain and keep your trapezius muscles from becoming too tight. When stretching, move gently and avoid bouncing or jerky movements. Hold each stretch for about 15-30 seconds.      Warm up: Before exercising, take time to warm up and stretch so you're less likely to injure your muscles.      Avoid repetitive motions: Repetitive motions can cause repetitive strain injury, which is damage to a muscle or tendon.      Maintain a healthy weight: Carrying extra weight can increase your risk of muscle strain.      Be aware of your posture: Hunching over can put increased stress on your trapezius muscles.      Chin retraction: To help with tech neck, try pulling your chin back while looking straight ahead. You should feel a double chin form under your jaw.      Other techniques that may help with trapezius pain include: Biofeedback training, Manual pressure release massage, Ischemic compression massage, and Cupping.      Tyrel received ultrasound to manage pain and inflammation at 10% duty cycle applied to the right shoulder below shoulder joint and in mid to posterior proximity of shouder joint as well as mid trapezius and upper trapezius at an intensity of  1.1mHz at 5 W/cm2  for a duration of 8 minutes to target built up edema visible in this area. Patient tolerated treatment well without adverse effects. Therapist was in attendance throughout intervention.        Patient Education and Home Exercises     Education provided:   - new HOME EXERCISE PROGRAM exercises  - Educated pt to use his TENS at home with moist heat for 10 min then ice pack for 10 min on mid trap area  - Progress and prognosis towards goals     Written Home Exercises Provided: Patient instructed to cont prior HEP.  Exercises were reviewed and Doni was able to demonstrate them prior to the end of the session.   Doni demonstrated good  understanding of the home exercise program provided. See electronic medical record under Patient Instructions for exercises provided during therapy sessions.       Assessment     Pt is understanding where his pain is coming from and how substitutionary movement can cause this problem. OT is providing biofeedback training, cueing for stretching and posture while completing exercise, to provide pt with feedback to catch his improper movement and make changes during his exercises to avoid straining his trapezius and cervical muscles.  He relates excellent understanding at this point.    Doni is progressing well towards his goals and there are no updates to goals at this time. Pt prognosis is Excellent overall.    Patient will continue to benefit from skilled outpatient occupational therapy to address the deficits listed in the problem list on initial evaluation provide patient/family education and to maximize patient's level of independence in the home and community environment.     Patient's spiritual, cultural and educational needs considered and patient agreeable to plan of care and goals.    Anticipated barriers to occupational therapy: none at this time    Goals:  Goals to be met in 4 weeks:  1) Pt will be independent and report performing HEP to maximize (R) shoulder functional capacity. ONGOING/PROGRESSING  2) Pt will increase shoulder PROM in all measured planes of motion by 10 degrees to A with daily task. ONGOING/PROGRESSING  3) Pt will report use of home modalities for pain management. MET  4) Pt will report one degree less of pain with nonuse and with use. MET  5) Pt will report interrupted sleep no more than twice a night. ONGOING/PROGRESSING    Plan     Updates/Grading for next session: adding Ultrasound to reduce edema in right shoulder.  Cont with POC as pt can tolerate.  Pt has new script that we will add to his EMR; we will update the visit numbers authorized by physician  as needed.    Jade Funez, OT   9/5/2024

## 2024-09-06 DIAGNOSIS — M54.2 CERVICALGIA: Primary | ICD-10-CM

## 2024-09-09 ENCOUNTER — CLINICAL SUPPORT (OUTPATIENT)
Dept: REHABILITATION | Facility: HOSPITAL | Age: 54
End: 2024-09-09
Payer: COMMERCIAL

## 2024-09-09 DIAGNOSIS — M54.2 CERVICALGIA: ICD-10-CM

## 2024-09-09 PROCEDURE — 97163 PT EVAL HIGH COMPLEX 45 MIN: CPT

## 2024-09-09 PROCEDURE — 97140 MANUAL THERAPY 1/> REGIONS: CPT

## 2024-09-09 PROCEDURE — 97110 THERAPEUTIC EXERCISES: CPT

## 2024-09-09 NOTE — PROGRESS NOTES
[facility]  Physical Therapy Initial Evaluation    Name: Tyrel Marion [facility] Number: [id number]    Therapy Diagnosis: No diagnosis found.  Physician: Dinora Benson FN*    Physician Orders: PT Eval and Treat   Medical Diagnosis from Referral: Cervicalgia (M54.2)  Evaluation Date: 2024  Authorization Period Expiration: ***  Visit # / Visits authorized: / ***    Time In: ***  Time Out: ***  Total Appointment Time (timed & untimed codes): *** minutes    Precautions: {IP WOUND PRECAUTIONS OHS:76041}    Subjective     Date of onset: 2024  History of current condition - Doni reports: Pt reports chronic right shoulder pain for several years. On 24, he underwent right rotator cuff repair and biceps tenodesis. He has been receiving outpt OT since 24, but is now referred to outpt PT for continued to c/o neck pain.     Medical History:   No past medical history on file.    Surgical History:   Right rotator cuff repair and biceps tenodesis on 24.    Medications:   Tyrel has a current medication list which includes the following prescription(s): hydrocodone-acetaminophen and meloxicam.    Allergies:   Review of patient's allergies indicates:  No Known Allergies     Imaging:   {DMJIMAGIN}    Prior Therapy: ***  Social History: lives with their spouse ***  Occupation: does maintenance work at [employer]  Prior Level of Function: chronic right shoulder pain  Current Level of Function: ***    Pain:  Current 1/10, worst 1/10, best 1/10   Location: right neck C6-C7  Description: Aching and Dull  Aggravating Factors: constant pain  Easing Factors:  constant pain    Current 0/10, worst 8/10, best 0/10   Location: right shoulder   Description: Aching, Throbbing, Tight, Sharp, Shooting, and Hot  Aggravating Factors: Flexing, Lifting, and lying supine or in left side lying  Easing Factors: ice and moving right arm    Pts goals: decrease pain in right shldr    Objective      Posture:   Forward head: yes  Thoracic curve: increased  Cervical curve: decreased  Laterally flexed: left  Rotated: right  Rounded shoulders: yes, right scapula protracted more than left  Scoliosis: yes  Shoulder height: left, increased, yet right scapula elevated compared to left  Clavical height: left, increased  Comments:    Cervical range of motion:  Cervical AROM Pain/Dysfunction with Movement   Flexion 100% End range stretch   Extension 100% NO   Right lateral flexion 100% YES   Left lateral flexion 100% YES   Right rotation 100% NO   Left rotation 100% NO     Cervical manual muscle test:    Right  Left    C1-C2 Chin up {MMT strength:46452:::1} {MMT strength:14440:::1}   C1-C2 Chin in {MMT shoulder:60451:::1} {MMT strength:77407:::1}   C3 Lateral flexion {MMT strength:93080:::1} {MMT strength:04736:::1}   C4 Shoulder Shrug {MMT strength:06696:::1} {MMT strength:92150:::1}   C5 Biceps {MMT strength:04970:::1} {MMT strength:69605:::1}   C6 Wrist extension {MMT strength:16735:::1} {MMT strength:52088:::1}   C7 Triceps {MMT strength:11817:::1} {MMT strength:64254:::1}     Shoulder range of motion: deferred to OT      Clinical Special Tests:   Compression test: Negative  Thoracic outlet: Positive RUE, Negative LUE  Distraction: Negative  Vertebral artery: right ; left   Alar ligament:   Scapular realignment:     Palpation:   Body side Increased tone   Sternocleidomastoid oliverio Yes   Scalenes oliverio Yes right side   1st rib {RIGHT Yes   Upper traps {RIGHT Yes   Suboccipitals  No   Transverse process {RIGHT/LEFT:58600} No   Spinous process {RIGHT/LEFT:74975} No   Pec Minor right Yes   Masseter right Yes   Mastoid process {RIGHT/LEFT:06010} No   Levator Scapula {RIGHT Yes   Middle deltoid  Right   YES    Comments:    Limitation/Restriction for FOTO Intake Survey    Therapist reviewed FOTO scores for Tyrel Jay on 9/9/2024.   FOTO documents entered into [employer] - see Media section.    Limitation Score: ***%    "    TREATMENT     Home Exercises and Patient Education Provided    Education provided:   ***    Written Home Exercises Provided: {Blank single:46557::"yes","Patient instructed to cont prior HEP"}.  Exercises were reviewed and Doni was able to demonstrate them prior to the end of the session.  Doni demonstrated {Desc; good/fair/poor:76960} understanding of the education provided.     See EMR under {Blank single:71247::"Media","Patient Instructions"} for exercises provided {Blank single:39855::"9/9/2024","prior visit"}.    Assessment     Tyrel is a 53 y.o. male referred to outpatient Physical Therapy with a medical diagnosis of ***. Pt presents with ***    Pt prognosis is {REHAB PROGNOSIS OHS:51680}.   Pt will benefit from skilled outpatient Physical Therapy to address the deficits stated above and in the chart below, provide pt/family education, and to maximize pt's level of independence.     Plan of care discussed with patient: Yes  Pt's spiritual, cultural and educational needs considered and patient is agreeable to the plan of care and goals as stated below:     Anticipated Barriers for therapy: work schedule    Decision Making/ Complexity Score: high    Goals:    Short Term Goals: 2 weeks   ***    Long Term Goals: 4 weeks   ***    Plan     Plan of care Certification: 9/9/2024 to ***.    Outpatient Physical Therapy {NUMBERS 1-5:79805} times weekly for 4 weeks to include the following interventions: {TX PLAN:74111}.       Mamta Malin, PT  [facility]  Physical Therapy Department      I CERTIFY THE NEED FOR THESE SERVICES FURNISHED UNDER THIS PLAN OF TREATMENT AND WHILE UNDER MY CARE.      Physician's Signature: _________________________________________  Date: __________________________     "

## 2024-09-10 PROBLEM — M54.2 CERVICALGIA: Status: ACTIVE | Noted: 2024-09-10

## 2024-09-10 NOTE — PLAN OF CARE
[facility]  Physical Therapy Initial Evaluation    Name: Tyrel Marion [facility] Number: [id number]    Therapy Diagnosis: No diagnosis found.  Physician: Dr. Michi Cosme    Physician Orders: PT Eval and Treat   Medical Diagnosis from Referral: Cervicalgia (M54.2)  Evaluation Date: 9/9/2024  Authorization Period Expiration: 10/11/2024  Visit # / Visits authorized: 1/ 9    Time In: 0813  Time Out: 0913  Total Appointment Time (timed & untimed codes): 60 minutes    Precautions: Standard    Subjective     Date of onset: 6/7/2024  History of current condition - Doni reports: Pt reports chronic right shoulder pain for several years. On 6/7/24, he underwent a right rotator cuff repair and biceps tenodesis. He has been receiving outpt OT since 7/17/24, but he continues to c/o shldr pain and, therefore, had a follow up with his surgeon (Dr. Michi Cosme) on 8/27/24 and was told that the source of the pain was actually his neck.     Medical History:   No past medical history on file.    Surgical History:   Right rotator cuff repair and biceps tenodesis on 6/7/24.    Medications:   Tyrel has a current medication list which includes the following prescription(s): hydrocodone-acetaminophen and meloxicam.    Allergies:   Review of patient's allergies indicates:  No Known Allergies     Imaging:   MRI 8/22/2024 right shoulder/neck: bulges from C3 to C7. Osteophytes C6-C7  with ligamentum thickening. Cervical spondylosis C6-C7 causing moderate to high-grade canal stenosis. Pt's shoulder repair/hardware still in proper positioning.    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  Current Level of Function: pt has not been able to return to his previous  "job of maintenance work and is currently working as a  past 2 weeks. Pt c/o tight muscles "all the time" in neck/R shldr. Pain worse in supine or left side lying.     Pain:  Current 1/10, worst 1/10, best 1/10   Location: right neck C6-C7  Description: Aching and Dull  Aggravating Factors: constant pain  Easing Factors: constant pain    Current 0/10, worst 8/10, best 0/10   Location: right shoulder   Description: Aching, Throbbing, Tight, Sharp, Shooting, and Hot  Aggravating Factors: Flexing, Lifting, and lying supine or in left side lying  Easing Factors: ice and moving right arm    Pts goals: decrease pain in right shldr    Objective     Posture:   Forward head: yes  Thoracic curve: increased  Cervical curve: decreased  Laterally flexed: left  Rotated: right  Rounded shoulders: yes, right scapula protracted more than left  Scoliosis: yes  Shoulder height: left, increased, yet right scapula elevated compared to left  Clavical height: left, increased  Comments:    Cervical range of motion:  Cervical AROM Pain/Dysfunction with Movement   Flexion 100% End range stretch   Extension 100% NO   Right lateral flexion 100% YES   Left lateral flexion 100% YES   Right rotation 100% NO   Left rotation 100% NO     Cervical manual muscle test: deferred to OT    Shoulder range of motion: deferred to OT      Clinical Special Tests:   Compression test: Negative  Thoracic outlet: Positive RUE x 2 tests, Negative LUE  Distraction: Negative      Palpation:   Body side Increased tone   Sternocleidomastoid oliverio Yes   Scalenes oliverio Yes right side   1st rib right Yes   Upper traps right Yes   Suboccipitals  No   Transverse process WFL No   Spinous process WFL No   Pec Minor right Yes   Masseter right Yes   Mastoid process WFL No   Levator Scapula right Yes   Middle deltoid  Right   YES    Comments:He has increased edema over right middle deltoid. Right clavicle more horizontal whereas left is a a sharp 45 degree angle " upwards. Right shoulder is elevated despite being right handed. Right scapula is significantly protracted and elevated.    Limitation/Restriction for FOTO Intake Survey    Therapist reviewed FOTO scores for Tyrel Jay on 9/9/2024.   FOTO documents entered into [employer] - see Media section.    Limitation Score: 56%       TREATMENT     Total Treatment time (time-based codes) separate from Evaluation: 25 minutes    Doni received the treatments listed below:  THERAPEUTIC EXERCISES to develop strength, ROM, and flexibility for cervical rot, cervical lat flexion, scapular retraction minutes including 10  MANUAL THERAPY TECHNIQUES including trigger point release were applied to oliverio neck, right shoulder, right pectoral release for 15 minutes.  Home Exercises and Patient Education Provided    Education provided:   Results of eval including postural deficits, POC, HEP.    Written Home Exercises Provided: yes. cervical rot, cervical lat flexion, scapular retraction, corner pectoral stretch  Exercises were reviewed and Doni was able to demonstrate them prior to the end of the session.  Doni demonstrated fair  understanding of the education provided.     See EMR under Media for exercises provided 9/9/2024.    Assessment     Tyrel is a 53 y.o. male referred to outpatient Physical Therapy with a medical diagnosis of Cervicalgia. Pt presents with hypertonicity/trigger points throughout right neck, shoulder and chest.  However, his primary complaint is right shoulder pain. He is negative for Cervical Compression and Distraction tests so pain does not appear to be caused by the cervical bulges. He does have symptoms of Thoracic Outlet Syndrome RUE and was positive TOS test RUE performed twice with diminished pulse, but he did not have any paresthesia. He has multiple postural deficits. He has full CROM, but he has pain during oliverio lateral flexion. He has significant edema over right middle deltoid and may benefit from  kinesio taping for edema control.    Pt prognosis is Good.   Pt will benefit from skilled outpatient Physical Therapy to address the deficits stated above and in the chart below, provide pt/family education, and to maximize pt's level of independence.     Plan of care discussed with patient: Yes  Pt's spiritual, cultural and educational needs considered and patient is agreeable to the plan of care and goals as stated below:     Anticipated Barriers for therapy: work schedule    Decision Making/ Complexity Score: high    Goals:    Short Term Goals: 2 weeks   Pt will be indep with HEP.  Pt's c/o right shoulder pain will be no higher than 5/10.  Pt will have no pain during CROM for oliverio lateral flexion.    Long Term Goals: 4 weeks   Pt will be indep with self-treatment techniques.  Pt will have normal tonicity in right neck, shoulder and chest musculature.  Pt will have a negative TOS test RUE.  Pt's c/o right shoulder pain will be no higher than 3/10 and neck pain abolished.  Pt's right scapula will be equidistant from spine compared to left scapula.    Plan     Plan of care Certification: 9/9/2024 to 10/11/2024.    Outpatient Physical Therapy 2 times weekly for 4 weeks to include the following interventions: Electrical Stimulation IFC, Manual Therapy, Moist Heat/ Ice, Neuromuscular Re-ed, Patient Education, Therapeutic Exercise, and Ultrasound.       Mamta Malin, PT  [facility]  Physical Therapy Department      I CERTIFY THE NEED FOR THESE SERVICES FURNISHED UNDER THIS PLAN OF TREATMENT AND WHILE UNDER MY CARE.      Physician's Signature: _________________________________________  Date: __________________________

## 2024-09-13 ENCOUNTER — CLINICAL SUPPORT (OUTPATIENT)
Dept: REHABILITATION | Facility: HOSPITAL | Age: 54
End: 2024-09-13
Payer: COMMERCIAL

## 2024-09-13 DIAGNOSIS — M25.511 ACUTE POSTOPERATIVE PAIN OF RIGHT SHOULDER: Primary | ICD-10-CM

## 2024-09-13 DIAGNOSIS — G89.18 ACUTE POSTOPERATIVE PAIN OF RIGHT SHOULDER: Primary | ICD-10-CM

## 2024-09-13 PROCEDURE — 97110 THERAPEUTIC EXERCISES: CPT

## 2024-09-13 PROCEDURE — 97530 THERAPEUTIC ACTIVITIES: CPT

## 2024-09-13 NOTE — PLAN OF CARE
[facility]  OT Discharge Note    Name: Tyrel Jay  [facility] Number: [id number]    Therapy Diagnosis:   Encounter Diagnosis   Name Primary?    Acute postoperative pain of right shoulder Yes     Physician: Dinora Benson FN*    Physician Orders: eval and treat as indicated for R shoulder pain post op  Medical Diagnosis: biceps tenodesis and RC repair  Evaluation Date: 7/17/2024      Date of Last visit: 9/13/2024  Total Visits Received: 11    ASSESSMENT      Pt has progressed well; he had a setback (no injury involved, just increased pain) in mid treatment and went back to his surgeon who had him stop therapy for period then return with script for cervicalgia.  Pt has progressed well and learned Home exercise program well.  See last note for full detail.    Discharge reason: Patient has completed the physician's prescription and Other:  he is not being treated for cervicalgia with physical therapy.    Discharge FOTO Score: 67%    Goals to be met in 4 weeks:  1) Pt will be independent and report performing HEP to maximize (R) shoulder functional capacity.MET  2) Pt will increase shoulder PROM in all measured planes of motion by 10 degrees to A with daily task. MET  3) Pt will report use of home modalities for pain management. MET  4) Pt will report one degree less of pain with nonuse and with use. MET  5) Pt will report interrupted sleep no more than twice a night. ONGOING, should help to concentrate on cervicalgia therapy with PT     LTG:  Pt will improve to WFL AROM of R shoulder with <2/10 pain in all activity including work. MET       PLAN   This patient is discharged from Occupational Therapy      Jade Funez OT

## 2024-09-13 NOTE — PROGRESS NOTES
[facility] [place] [facility]  Occupational Therapy Treatment Note     Date: 9/13/2024  Name: Tyrel [facility]  Clinic Number: [id number]    Therapy Diagnosis:   Encounter Diagnosis   Name Primary?    Acute postoperative pain of right shoulder Yes       Physician: Dinora Benson FN*    Referral Orders: Eval and Treat  Age: 53 y.o.  Sex: male          Hand dominance: Right  Physician Orders: Eval and Treat  Medical Diagnosis: biceps tenodesis and RC repair   Surgical Procedure and Date: RC repair and biceps tenodesis, 6/7/2024  Evaluation Date: 7/17/2024  Insurance Authorization Period Expiration: 7/17/2024 through 9/17/2024  Plan of Care Certification Period: 7/17/2024 through 9/17/2024  Date of Return to MD: 4 weeks from therapy eval  Visit # / Visits authorized: 11/ 12  FOTO: 1/ 3, INTAKE FOTO SCORE=44%, 51% VISIT #5, 57% VISIT 9, 67% LAST VISIT (visit 11)     Precautions:  none indicated     Time In: 1030  Time Out: 1108  TOTAL MINUTES: 38 minutes      Subjective     Patient reports I'm gonna start my neck exercise work with PT next week.  I think I can do all these shoulder exercises at home, and I'll make sure I'm trying not to use my neck and upper shoulder rather than my shoulder muscles when I'm doing them.  He was compliant with home exercise program given last session.   Response to previous treatment:progressing  Functional change: progressing      Pain: 0/10   Location: right shoulder      Objective     Objective Measures updated at progress report unless specified.    Treatment       Doni received the treatments listed below:        THEREX modified today due to acute pain and for approx 30 minutes:  UBE x 5 min in no resist form to avoid straining trapezius  IR stretch x 10  ER stretch in corner x 10  3 way pulleys 2 min each direction  SCAP 3 way RED 25x  RC 4 way 25 reps each RED    THERACT x 8 min:  Wall slides 1# wrist weight, 15 reps each direction    Pt needed to have the  following education reiterated:  To avoid trapezius strain during shoulder therapy, you can try these tips:      Avoid overexertion: Gradually increase the intensity of your exercise routine to prevent sudden muscle strain.      Stretch: Stretching can help prevent or relieve pain and keep your trapezius muscles from becoming too tight. When stretching, move gently and avoid bouncing or jerky movements. Hold each stretch for about 15-30 seconds.      Warm up: Before exercising, take time to warm up and stretch so you're less likely to injure your muscles.      Avoid repetitive motions: Repetitive motions can cause repetitive strain injury, which is damage to a muscle or tendon.      Maintain a healthy weight: Carrying extra weight can increase your risk of muscle strain.      Be aware of your posture: Hunching over can put increased stress on your trapezius muscles.      Chin retraction: To help with tech neck, try pulling your chin back while looking straight ahead. You should feel a double chin form under your jaw.      Other techniques that may help with trapezius pain include: Biofeedback training, Manual pressure release massage, Ischemic compression massage, and Cupping        Patient Education and Home Exercises     Education provided:   -  HOME EXERCISE PROGRAM exercises      Written Home Exercises Provided: Patient instructed to cont prior HEP.  Exercises were reviewed and Doni was able to demonstrate them prior to the end of the session.  Doni demonstrated good  understanding of the home exercise program provided. See electronic medical record under Patient Instructions for exercises provided during therapy sessions.       Assessment     Pt is understanding where his pain is coming from and how substitutionary movement can cause this problem. OT is providing biofeedback training, cueing for stretching and posture while completing exercise, to provide pt with feedback to catch his improper movement  and make changes during his exercises to avoid straining his trapezius and cervical muscles.  He relates excellent understanding at this point.    Doni is progressing well towards his goals and there are no updates to goals at this time. Pt prognosis is Excellent overall.    Patient ready to transition to PT for cervicalgia needs.    Patient's spiritual, cultural and educational needs considered and patient agreeable to plan of care and goals.    Anticipated barriers to occupational therapy: none at this time    Goals:  Goals to be met in 4 weeks:  1) Pt will be independent and report performing HEP to maximize (R) shoulder functional capacity.MET  2) Pt will increase shoulder PROM in all measured planes of motion by 10 degrees to A with daily task. MET  3) Pt will report use of home modalities for pain management. MET  4) Pt will report one degree less of pain with nonuse and with use. MET  5) Pt will report interrupted sleep no more than twice a night. ONGOING, should help to concentrate on cervicalgia therapy with PT    LTG:  Pt will improve to WFL AROM of R shoulder with <2/10 pain in all activity including work.    Plan   Discharge from OT and pt will  with physical therapy to work on neck and upper trap pain.      Pt has skills to cont HOME EXERCISE PROGRAM at home for shoulder, some physical therapy exercises will coincide with shoulder program, so that will help as well.  Pt relates excellent understanding that today is d/c from OT, but he reports he will cont his shoulder exercises appropriately.  Jade Funez, OT   9/13/2024

## 2024-09-16 ENCOUNTER — CLINICAL SUPPORT (OUTPATIENT)
Dept: REHABILITATION | Facility: HOSPITAL | Age: 54
End: 2024-09-16
Payer: COMMERCIAL

## 2024-09-16 DIAGNOSIS — M54.2 CERVICALGIA: Primary | ICD-10-CM

## 2024-09-16 PROCEDURE — 97140 MANUAL THERAPY 1/> REGIONS: CPT

## 2024-09-16 PROCEDURE — 97035 APP MDLTY 1+ULTRASOUND EA 15: CPT

## 2024-09-16 PROCEDURE — 97110 THERAPEUTIC EXERCISES: CPT

## 2024-09-16 NOTE — PROGRESS NOTES
"  Physical Therapy Treatment Note     Name: [facility] Number: [id number]    Therapy Diagnosis: No diagnosis found.  Physician: Michi Cosme MD    Visit Date: 9/16/2024    Physician Orders: PT Eval and Treat   Medical Diagnosis from Referral: Cervicalgia   Evaluation Date: 9/9/2024   Authorization Period Expiration: 10/11/2024     Visit # / Visits authorized: 2/9   PTA Visit #: 0/5    Time In: 1454  Time Out: 1543  Total Billable Time: 49 minutes    Precautions: Standard  Functional Level Prior to Evaluation:  chronic right shoulder pain for several years.     Subjective     Pt reports: Pt states he was discharged from outpt OT last week. C/o shldr being "sore all the time." States he worked all weekend and was unable to fully perform HEP. States post-treatment he felt like neck and R shoulder were "looser" and pain had decreased.    He was not compliant with home exercise program.  Response to previous treatment: No adverse reaction reported.  Functional change: Too soon to determine.    Pain: 2/10, 1-2/10  Location: right neck, right shoulder    Objective     Doni received therapeutic exercises to develop strength, ROM, flexibility, and posture for 21 minutes including:    *Cerv Rotation, Left 10" x 5 reps   *Cerv Lateral Flexion, Left 10" x 5 reps   *Pectoral Corner Stretch - 3 positions 10" x 5 reps   *Scalene Towel Stretch, Right 30" x 4 reps   *Prone Scapular Retraction, oliverio-at 45 degree 5" x 10   Thoracic Extension using foam roller perpendicular to spine    Quadruped Scapula Push-Up    *Seated Scapular Retraction 5"x20                                                                           Doni received the following manual therapy techniques: Joint mobilizations, Manual traction, Myofacial release, Soft tissue Mobilization, and TPR were applied to the: NECK/R SHOULDER for 20 minutes, including: STM/TPR to R scalenes, R upper trap, about C7, R middle Deltoid and about R " Mastoid Process. R Pectoral release in sternal and clavicular portions.     Doni participated in neuromuscular re-education activities to improve: Posture for 0 minutes. The following activities were included:     Doni received the following direct contact modalities after being cleared for contraindications: Ultrasound:  Tyrel received ultrasound to manage pain and inflammation at 100 % duty cycle applied to the right neck and C7 at an intensity of 1.5 W/cm2  for a duration of 7 minutes/8 min clean up. Patient tolerated treatment well without adverse effects. Therapist was in attendance throughout intervention.    Doni received the following supervised modalities after being cleared for contradictions: IFC Electrical Stimulation:  Tyrel received IFC Electrical Stimulation for pain control applied to the XX. Pt received stimulation at 100 % scan at a frequency of XX for 0 minutes. Tyrel tolderated treatment well without any adverse effects.      Doni received hot pack for 0 minutes to xx.    Doni received cold pack for 6 minutes to right neck/shldr.      Home Exercises Provided and Patient Education Provided     Education provided: HEP    Written Home Exercises Provided: yes. PT added ex's today.  Exercises were reviewed and Doni was able to demonstrate them prior to the end of the session.  Doni demonstrated fair  understanding of the education provided.     See EMR under Media for exercises provided prior visit.    Assessment     Post-treatment neck pain decreased from 2/10 to 1/10 and right shoulder from 2/10 to 0/10.    Doni Is progressing well towards his goals.   Pt prognosis is Good.     Pt will continue to benefit from skilled outpatient physical therapy to address the deficits listed in the problem list box on initial evaluation, provide pt/family education and to maximize pt's level of independence in the home and community environment.     Pt's spiritual, cultural and  educational needs considered and pt agreeable to plan of care and goals.     Anticipated barriers to physical therapy: work schedule    Goals:    Short Term Goals: 2 weeks   Pt will be indep with HEP.  Pt's c/o right shoulder pain will be no higher than 5/10.  Pt will have no pain during CROM for oliverio lateral flexion.     Long Term Goals: 4 weeks   Pt will be indep with self-treatment techniques.  Pt will have normal tonicity in right neck, shoulder and chest musculature.  Pt will have a negative TOS test RUE.  Pt's c/o right shoulder pain will be no higher than 3/10 and neck pain abolished.  Pt's right scapula will be equidistant from spine compared to left scapula    Plan     NEXT VISIT: HEP, STM to right neck with pt in supine.    Plan of care Certification: 9/9/2024 to 10/11/2024.     Outpatient Physical Therapy 2 times weekly for 4 weeks to include the following interventions: Electrical Stimulation IFC, Manual Therapy, Moist Heat/ Ice, Neuromuscular Re-ed, Patient Education, Therapeutic Exercise, and Ultrasound.     Mamta Malin, PT  9/16/2024

## 2024-09-23 ENCOUNTER — CLINICAL SUPPORT (OUTPATIENT)
Dept: REHABILITATION | Facility: HOSPITAL | Age: 54
End: 2024-09-23
Payer: COMMERCIAL

## 2024-09-23 DIAGNOSIS — M54.2 CERVICALGIA: Primary | ICD-10-CM

## 2024-09-23 PROCEDURE — 97035 APP MDLTY 1+ULTRASOUND EA 15: CPT

## 2024-09-23 PROCEDURE — 97140 MANUAL THERAPY 1/> REGIONS: CPT

## 2024-09-23 PROCEDURE — 97110 THERAPEUTIC EXERCISES: CPT

## 2024-09-23 NOTE — PROGRESS NOTES
"  Physical Therapy Treatment Note     Name: Tyrel Marion [facility] Number: [id number]    Therapy Diagnosis: No diagnosis found.  Physician: Michi Cosme MD    Visit Date: 9/23/2024    Physician Orders: PT Eval and Treat   Medical Diagnosis from Referral: Cervicalgia   Evaluation Date: 9/9/2024   Authorization Period Expiration: 10/11/2024     Visit # / Visits authorized: 3/9 (missed 1 visit)  PTA Visit #: 0/5    Time In: 1448  Time Out: 1544  Total Billable Time: 56 minutes    Precautions: Standard  Functional Level Prior to Evaluation:  chronic right shoulder pain for several years.     Subjective     Pt reports: Pt c/o right lev scap pain. 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    He was not compliant with home exercise program.  Response to previous treatment: No adverse reaction reported.  Functional change: Too soon to determine.    Pain: 1-2/10, 2/10  Location: right neck, right shoulder    Florentin Marion received therapeutic exercises to develop strength, ROM, flexibility, and posture for 11 minutes including:    *Cerv Rotation, Left 10" x 5 reps (not today)   *Cerv Lateral Flexion, Left 10" x 5 reps (not today)   *Pectoral Corner Stretch - 3 positions 10" x 5 reps (not today)   *Scalene Towel Stretch, Right 30" x 4 reps   *Prone Scapular Retraction, oliverio-at 45 degree 5" x 10   Thoracic Extension using foam roller perpendicular to spine (not today)       *Seated Scapular Retraction 5"x20 (not today)                                                                           Doni received the following manual therapy techniques: Joint mobilizations, Manual traction, Myofacial release, Soft tissue Mobilization, and TPR were applied to the: NECK/R SHOULDER for 37 minutes, including: STM/TPR to R scalenes, R upper trap, about C7, R Lev Scap and about R Mastoid Process. R Pectoral release in " sternal and clavicular portions, mobilization right scapula/stretching R Rhomboids in Left side lying d/t being stuck on thorax..     Doni participated in neuromuscular re-education activities to improve: Posture for 0 minutes. The following activities were included:     Doni received the following direct contact modalities after being cleared for contraindications: Ultrasound:  Tyrel received ultrasound to manage pain and inflammation at 100 % duty cycle applied to the right neck at an intensity of 1.5 W/cm2  for a duration of 7 minutes/8 min clean up. Patient tolerated treatment well without adverse effects. Therapist was in attendance throughout intervention.    Doni received the following supervised modalities after being cleared for contradictions: IFC Electrical Stimulation:  Tyrel received IFC Electrical Stimulation for pain control applied to the XX. Pt received stimulation at 100 % scan at a frequency of XX for 0 minutes. Tyrel tolderated treatment well without any adverse effects.      Doni received hot pack for 0 minutes to xx.    Doni received cold pack for 6 minutes to right neck/shldr.      Home Exercises Provided and Patient Education Provided     Education provided: HEP    Written Home Exercises Provided: Patient instructed to cont prior HEP.   Exercises were reviewed and Doni was able to demonstrate them prior to the end of the session.  Doni demonstrated fair  understanding of the education provided.     See EMR under Media for exercises provided prior visit.    Assessment     Post-treatment neck/shldr pain decreased from 2/10 0/10. Pt's right rounded shoulder resolving, but right scapula is stuck on his thorax and limiting proper biomechanics of glenohumeral joint.    Doni Is progressing well towards his goals.   Pt prognosis is Good.     Pt will continue to benefit from skilled outpatient physical therapy to address the deficits listed in the problem list box on initial  evaluation, provide pt/family education and to maximize pt's level of independence in the home and community environment.     Pt's spiritual, cultural and educational needs considered and pt agreeable to plan of care and goals.     Anticipated barriers to physical therapy: work schedule    Goals:    Short Term Goals: 2 weeks   Pt will be indep with HEP.-MET  Pt's c/o right shoulder pain will be no higher than 5/10.  Pt will have no pain during CROM for oliverio lateral flexion.     Long Term Goals: 4 weeks   Pt will be indep with self-treatment techniques.  Pt will have normal tonicity in right neck, shoulder and chest musculature.  Pt will have a negative TOS test RUE.  Pt's c/o right shoulder pain will be no higher than 3/10 and neck pain abolished.  Pt's right scapula will be equidistant from spine compared to left scapula    Plan     NEXT VISIT: HEP, STM to right neck with pt in supine.    Plan of care Certification: 9/9/2024 to 10/11/2024.     Outpatient Physical Therapy 2 times weekly for 4 weeks to include the following interventions: Electrical Stimulation IFC, Manual Therapy, Moist Heat/ Ice, Neuromuscular Re-ed, Patient Education, Therapeutic Exercise, and Ultrasound.     Mamta Malin, PT  9/23/2024

## 2024-09-25 ENCOUNTER — DOCUMENTATION ONLY (OUTPATIENT)
Dept: REHABILITATION | Facility: HOSPITAL | Age: 54
End: 2024-09-25
Payer: COMMERCIAL

## 2024-09-29 NOTE — PROGRESS NOTES
[facility]   Physical Therapy Treatment Note      Name: Tyrel Marion [facility] Number: [id number]    Therapy Diagnosis: No diagnosis found.  Physician: Michi Cosme MD    Visit Date: 9/30/2024    Physician Orders: PT Eval and Treat   Medical Diagnosis from Referral: Cervicalgia   Evaluation Date: 9/9/2024   Authorization Period Expiration: 10/11/2024      Visit # / Visits authorized: 4/9   PTA Visit #: 0/5     Time In: 1448  Time Out: 1528  Total Billable Time: 40 minutes     Precautions: Standard      Subjective     Patient reports: neck is not hurting at all.  He was compliant with home exercise program.  Response to previous treatment: neck pain is better but shoulder is still nagging  Functional change: no change    Pain: 3/10  Location: right shoulder      Objective      Objective Measures updated at progress report unless specified.     Treatment     Doni received the treatments listed below:      therapeutic exercises to develop strength, ROM, flexibility, and posture for 25 minutes including:  Nu Step/UBE x 3 min  Scap retract x 30  Corner pect stretch x 3 with 30 second holds  Supine flexion with scap retract x 20   Theraband rows x 20 with red band  Standing bilateral scap retract with shoulder flexion to 90 degrees with visual and verbal cues for quality of movement.     manual therapy techniques: Joint mobilizations and Soft tissue Mobilization were applied to the: right shoulder for 5 minutes, including:  A-P joint mobs to right shoulder x 5  Manual distraction and Passive range of motion to right shoulder for improved ROM and movement quality    neuromuscular re-education activities to improve: Coordination, Proprioception, and Posture for 10 minutes. The following activities were included:  Scap PNF x 20  Sidelying ER with retract and tactile cues 2 x 20  Sidelying scaption with scap retract x 20        cold pack for 0 minutes to not today.    Patient  Education and Home Exercises       Education provided:   - patient educated on correct shoulder and scapulohumeral movement quality and importance of scapular stability    Written Home Exercises Provided: Yes. Exercises were reviewed and Doni was able to demonstrate them prior to the end of the session.  Doni demonstrated good  understanding of the education provided. See Electronic Medical Record under Patient Instructions for exercises provided during therapy sessions       HOME EXERCISE PROGRAM  Created by NAOMI Del Rosario PT  Sep 30th, 2024  View videos at [email or web address]        SCAPULAR RETRACTIONS    Move your shoulder blades back and down. Hold, relax and repeat. Repeat 10 Times   Hold 1 Second   Complete 3 Sets   Perform 2 Times a Day          Corner Pectoral Stretch-Mid level    Place either foot forward into the corner for support. Elbows at rest on the wall at about 90 degrees as shown. Bend the lead leg knee and lean into the corner until a comfortable stretch is felt on the chest muscles. Hold for prescribed time. Then use your leg to come out of the corner/stretch back to the start position. Rest and repeat for the prescribed number of repetitions. Be sure to stay in the pain free range. Repeat 3 Times   Hold 30 Seconds   Complete 1 Set   Perform 1 Times a Day          SIDE LYING EXTERNAL ROTATION WITH TOWEL - ER    Lie on your side with your elbow bent to 90 degrees. Place a rolled up towel between your arm and the side your body as shown.    Squeeze your shoulder blade back and down toward your buttocks and hold that position.    Next, roll your arm upwards from your stomach area towards the ceiling while maintaining your arm against the towel and with your shoulder blade held down and back the entire time. Lower your arm and repeat.  You can hold a 1-2 lb weight in your hand for this one too    Video # [id number] Repeat 10 Times   Hold 1 Second   Complete 2 Sets   Perform 2 Times a Day           Theraband Rows    Stand with theraband in both hands with arms outstretched in front of you and thumbs up towards the ceiling. Squeeze the shoulder blades back and down and bend the elbows to row back. Keep the upper part of shoulders relaxed and down away from ears. Repeat 10 Times   Hold 1 Second   Complete 2 Sets   Perform 2 Times a Day              Assessment     Patient demonstrating muscle imbalance and poor scap stability in right shoulder.  Following session his pain was much improved and his awareness of correct movement quality much improved.     Doni Is progressing well towards his goals.   Patient prognosis is Good.     Patient will continue to benefit from skilled outpatient physical therapy to address the deficits listed in the problem list box on initial evaluation, provide pt/family education and to maximize pt's level of independence in the home and community environment.     Patient's spiritual, cultural and educational needs considered and pt agreeable to plan of care and goals.     Anticipated barriers to physical therapy: none identified    Goals:    Short Term Goals: 2 weeks   Pt will be indep with HEP.-MET  Pt's c/o right shoulder pain will be no higher than 5/10.  Pt will have no pain during CROM for oliverio lateral flexion.     Long Term Goals: 4 weeks   Pt will be indep with self-treatment techniques.  Pt will have normal tonicity in right neck, shoulder and chest musculature.  Pt will have a negative TOS test RUE.  Pt's c/o right shoulder pain will be no higher than 3/10 and neck pain abolished.  Pt's right scapula will be equidistant from spine compared to left scapula       Plan     Continue Plan of care with progression as appropriate    RICH MORAN, PT, ATP  09/29/2024

## 2024-09-30 ENCOUNTER — CLINICAL SUPPORT (OUTPATIENT)
Dept: REHABILITATION | Facility: HOSPITAL | Age: 54
End: 2024-09-30
Payer: COMMERCIAL

## 2024-09-30 DIAGNOSIS — G89.18 ACUTE POSTOPERATIVE PAIN OF RIGHT SHOULDER: ICD-10-CM

## 2024-09-30 DIAGNOSIS — M25.511 ACUTE POSTOPERATIVE PAIN OF RIGHT SHOULDER: ICD-10-CM

## 2024-09-30 DIAGNOSIS — M54.2 CERVICALGIA: Primary | ICD-10-CM

## 2024-09-30 PROCEDURE — 97110 THERAPEUTIC EXERCISES: CPT

## 2024-09-30 PROCEDURE — 97112 NEUROMUSCULAR REEDUCATION: CPT

## 2024-10-01 NOTE — PROGRESS NOTES
[facility]   Physical Therapy Treatment Note      Name: Tyrel Marion [facility] Number: [id number]    Therapy Diagnosis: No diagnosis found.  Physician: Michi Cosme MD    Visit Date: 10/2/2024    Physician Orders: PT Eval and Treat   Medical Diagnosis from Referral: Cervicalgia   Evaluation Date: 9/9/2024   Authorization Period Expiration: 10/11/2024      Visit # / Visits authorized: 5/9   PTA Visit #: 0/5     Time In: 1450  Time Out: 1523  Total Billable Time: 33 minutes     Precautions: Standard      Subjective     Patient reports: shoulders are sore but not hurting  He was compliant with home exercise program.  Response to previous treatment: neck pain is better but shoulder is still nagging  Functional change: no change    Pain: 0/10  Location: right shoulder      Objective      Objective Measures updated at progress report unless specified.     Treatment     Doni received the treatments listed below:      therapeutic exercises to develop strength, ROM, flexibility, and posture for 23 minutes including:  Retro UBE x 4 min  Scap retract x 30  Corner pect stretch x 3 with 30 second holds  Supine flexion with scap retract x 20   Theraband rows x 30 with red band  Theraband bilateral shoulder ext x 30 with red band  Standing bilateral scap retract with shoulder flexion to 90 degrees with visual and verbal cues for quality of movement.     manual therapy techniques: Joint mobilizations and Soft tissue Mobilization were applied to the: right shoulder for 6 minutes, including:  A-P joint mobs to right shoulder x 5  Manual distraction and Passive range of motion to right shoulder for improved ROM and movement quality    neuromuscular re-education activities to improve: Coordination, Proprioception, and Posture for 4 minutes. The following activities were included:  Scap PNF x 20  Sidelying ER with retract and tactile cues 2 x 20  Sidelying scaption with scap retract x 20  (not this visit)        cold pack for 0 minutes to not today.    Patient Education and Home Exercises       Education provided:   - patient educated on correct shoulder and scapulohumeral movement quality and importance of scapular stability    Written Home Exercises Provided: Pt instructed to continue prior HEP. Exercises were reviewed and Doni was able to demonstrate them prior to the end of the session.  Doni demonstrated good  understanding of the education provided. See Electronic Medical Record under Patient Instructions for exercises provided during therapy sessions       HOME EXERCISE PROGRAM  Created by Taylor Conway PT, ATP  Sep 30th, 2024  View videos at [email or web address]        SCAPULAR RETRACTIONS    Move your shoulder blades back and down. Hold, relax and repeat. Repeat 10 Times   Hold 1 Second   Complete 3 Sets   Perform 2 Times a Day          Corner Pectoral Stretch-Mid level    Place either foot forward into the corner for support. Elbows at rest on the wall at about 90 degrees as shown. Bend the lead leg knee and lean into the corner until a comfortable stretch is felt on the chest muscles. Hold for prescribed time. Then use your leg to come out of the corner/stretch back to the start position. Rest and repeat for the prescribed number of repetitions. Be sure to stay in the pain free range. Repeat 3 Times   Hold 30 Seconds   Complete 1 Set   Perform 1 Times a Day          SIDE LYING EXTERNAL ROTATION WITH TOWEL - ER    Lie on your side with your elbow bent to 90 degrees. Place a rolled up towel between your arm and the side your body as shown.    Squeeze your shoulder blade back and down toward your buttocks and hold that position.    Next, roll your arm upwards from your stomach area towards the ceiling while maintaining your arm against the towel and with your shoulder blade held down and back the entire time. Lower your arm and repeat.  You can hold a 1-2 lb weight in your hand for this  one too    Video # [id number] Repeat 10 Times   Hold 1 Second   Complete 2 Sets   Perform 2 Times a Day          Theraband Rows    Stand with theraband in both hands with arms outstretched in front of you and thumbs up towards the ceiling. Squeeze the shoulder blades back and down and bend the elbows to row back. Keep the upper part of shoulders relaxed and down away from ears. Repeat 10 Times   Hold 1 Second   Complete 2 Sets   Perform 2 Times a Day              Assessment     Patient sore from new exercises so no new HEP added. Some moderate fatigue noted with session.     Doni Is progressing well towards his goals.   Patient prognosis is Good.     Patient will continue to benefit from skilled outpatient physical therapy to address the deficits listed in the problem list box on initial evaluation, provide pt/family education and to maximize pt's level of independence in the home and community environment.     Patient's spiritual, cultural and educational needs considered and pt agreeable to plan of care and goals.     Anticipated barriers to physical therapy: none identified    Goals:    Short Term Goals: 2 weeks   Pt will be indep with HEP.-MET  Pt's c/o right shoulder pain will be no higher than 5/10.  Pt will have no pain during CROM for oliverio lateral flexion.     Long Term Goals: 4 weeks   Pt will be indep with self-treatment techniques.  Pt will have normal tonicity in right neck, shoulder and chest musculature.  Pt will have a negative TOS test RUE.  Pt's c/o right shoulder pain will be no higher than 3/10 and neck pain abolished.  Pt's right scapula will be equidistant from spine compared to left scapula       Plan     Continue Plan of care with progression as appropriate    RICH MORAN, PT, ATP  10/01/2024

## 2024-10-02 ENCOUNTER — CLINICAL SUPPORT (OUTPATIENT)
Dept: REHABILITATION | Facility: HOSPITAL | Age: 54
End: 2024-10-02
Payer: COMMERCIAL

## 2024-10-02 DIAGNOSIS — M25.511 ACUTE POSTOPERATIVE PAIN OF RIGHT SHOULDER: Primary | ICD-10-CM

## 2024-10-02 DIAGNOSIS — M54.2 CERVICALGIA: ICD-10-CM

## 2024-10-02 DIAGNOSIS — G89.18 ACUTE POSTOPERATIVE PAIN OF RIGHT SHOULDER: Primary | ICD-10-CM

## 2024-10-02 PROCEDURE — 97110 THERAPEUTIC EXERCISES: CPT

## 2024-10-21 PROBLEM — G89.18 ACUTE POSTOPERATIVE PAIN OF RIGHT SHOULDER: Status: RESOLVED | Noted: 2024-07-17 | Resolved: 2024-10-21

## 2024-10-21 PROBLEM — M25.511 ACUTE POSTOPERATIVE PAIN OF RIGHT SHOULDER: Status: RESOLVED | Noted: 2024-07-17 | Resolved: 2024-10-21

## 2024-11-21 ENCOUNTER — DOCUMENTATION ONLY (OUTPATIENT)
Dept: REHABILITATION | Facility: HOSPITAL | Age: 54
End: 2024-11-21
Payer: COMMERCIAL

## 2024-11-21 NOTE — PROGRESS NOTES
[facility]  PT Discharge Note    Name: Tyrel Marion [facility] Number: [id number]    Therapy Diagnosis: Cervicalgia  Physician: Michi Cosme MD    Physician Orders: PT Eval and Treat   Medical Diagnosis from Referral: Cervicalgia   Evaluation Date: 9/9/2024       Date of Last visit: 10/01/2024  Total Visits Received: 5    ASSESSMENT      Patient no showed or cancelled for last 5 visits without reason given. Discharge due to non compliant attendance     Discharge reason: Patient has not attended therapy since 10/1/24    Discharge FOTO Score: none    Goals:     Short Term Goals: 2 weeks   Pt will be indep with HEP.-MET  Pt's c/o right shoulder pain will be no higher than 5/10.  Pt will have no pain during CROM for oliverio lateral flexion.     Long Term Goals: 4 weeks   Pt will be indep with self-treatment techniques.  Pt will have normal tonicity in right neck, shoulder and chest musculature.  Pt will have a negative TOS test RUE.  Pt's c/o right shoulder pain will be no higher than 3/10 and neck pain abolished.  Pt's right scapula will be equidistant from spine compared to left scapula    PLAN   This patient is discharged from Physical Therapy      RICH MORAN, PT , ATP  11/21/2024